# Patient Record
Sex: MALE | Race: WHITE
[De-identification: names, ages, dates, MRNs, and addresses within clinical notes are randomized per-mention and may not be internally consistent; named-entity substitution may affect disease eponyms.]

---

## 2021-07-21 ENCOUNTER — HOSPITAL ENCOUNTER (INPATIENT)
Dept: HOSPITAL 56 - MW.ED | Age: 61
LOS: 9 days | Discharge: HOME | DRG: 231 | End: 2021-07-30
Attending: SURGERY | Admitting: SURGERY
Payer: COMMERCIAL

## 2021-07-21 DIAGNOSIS — Z20.822: ICD-10-CM

## 2021-07-21 DIAGNOSIS — K37: ICD-10-CM

## 2021-07-21 DIAGNOSIS — K55.041: ICD-10-CM

## 2021-07-21 DIAGNOSIS — Z79.899: ICD-10-CM

## 2021-07-21 DIAGNOSIS — T81.49XA: ICD-10-CM

## 2021-07-21 DIAGNOSIS — Z87.891: ICD-10-CM

## 2021-07-21 DIAGNOSIS — K57.20: Primary | ICD-10-CM

## 2021-07-21 LAB
BUN SERPL-MCNC: 15 MG/DL (ref 7–18)
CHLORIDE SERPL-SCNC: 102 MMOL/L (ref 98–107)
CO2 SERPL-SCNC: 27.3 MMOL/L (ref 21–32)
GLUCOSE SERPL-MCNC: 95 MG/DL (ref 74–106)
HBA1C BLD-MCNC: 4.8 %
POTASSIUM SERPL-SCNC: 3.5 MMOL/L (ref 3.5–5.1)
SODIUM SERPL-SCNC: 141 MMOL/L (ref 136–148)

## 2021-07-21 PROCEDURE — U0002 COVID-19 LAB TEST NON-CDC: HCPCS

## 2021-07-21 PROCEDURE — C9113 INJ PANTOPRAZOLE SODIUM, VIA: HCPCS

## 2021-07-21 PROCEDURE — G0378 HOSPITAL OBSERVATION PER HR: HCPCS

## 2021-07-21 NOTE — CT
Indication:



Lower abdominal pain, patient was told he had perforation



Technique:



Volumetric multidetector CT images of the abdomen and pelvis were obtained 

after the administration of intravenous contrast.



75 cc Isovue 370 low osmolar intravenous contrast



Comparison:



None available.



Findings:



There is basilar atelectasis seen within the lung bases.



The liver is mildly prominent with minimal hepatic steatosis. There is 

subcentimeter hypodensity within the inferior right liver lobe, too small 

to characterize.



The portal vein is patent.



The gallbladder is unremarkable without evidence of radiopaque calculus.



There is no significant common biliary ductal dilatation or abrupt cut off.



The spleen is normal in enhancement and size.



The stomach and duodenum are grossly unremarkable.



The pancreas is normal in enhancement without significant atrophy.



The adrenal glands are unremarkable.



The kidneys demonstrate preserved corticomedullary differentiation without 

evidence of obstructive uropathy.



There is demonstration of marked thickening of the sigmoid colon which is 

somewhat redundant within the right lower quadrant with focal perforation 

just posterior to the terminal ileum measuring 4.0 x 3.3 centimeters. There 

is likely a secondary inflammatory changes in of the distal small bowel. 

The distal bowel is otherwise grossly within normal limits. There is mild 

fluid distention seen throughout the colon.



The appendix is not well visualized.



There is no significant mesenteric, retroperitoneal, or pelvic sidewall 

lymph nodes.



The aorta is nonaneurysmal.  There is no significant atherosclerotic 

disease appreciated.



The solid pelvic viscera are grossly unremarkable.



There is extensive free air seen throughout the entirety of the abdomen 

with minimal fluid and inflammatory changes in the right lower quadrant 

mesentery. There is no evidence of significant free fluid.



There is a small fat containing umbilical hernia.



The lumbar vertebral body heights are grossly maintained with minimal 

endplate Schmorl`s defects. There is no significant spondylolisthesis.



Impression:



Demonstration of marked thickening and pericolonic inflammation of the 

redundant sigmoid colon within the right lower quadrant with a large 

perforation along the superior border of the colon with extensive free air 

and a loculated focus of free air within the right lower quadrant 

mesentery. There is secondary inflammatory change of adjacent small bowel 

loops. There is no evidence of significant free fluid or rim enhancing 

abscess formation at this time. 



Findings were discussed with Dr. Maya at 7:20 p.m.   July 21, 2021



Please note that all CT scans at this facility use dose modulation, 

iterative reconstruction, and/or weight-based dosing when appropriate to 

reduce radiation dose to as low as reasonably achievable.



Dictated by Xiang Costello MD @ 7/21/2021 7:31:24 PM



Signed by Dr. Xiang Costello @ Jul 21 2021  7:31PM
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
## 2021-07-21 NOTE — EDM.PDOC
ED HPI GENERAL MEDICAL PROBLEM





- General


Chief Complaint: Abdominal Pain


Stated Complaint: [facility] REFERRAL, POSSIBLE PREFORATED BOWEL


Time Seen by Provider: 07/21/21 17:01


Source of Information: Reports: Patient


History Limitations: Reports: No Limitations





- History of Present Illness


INITIAL COMMENTS - FREE TEXT/NARRATIVE: 


HISTORY AND PHYSICAL:





History of present illness:


Patient is a 61-year-old male who presents to the emergency room from [facility] with complaints of abdominal pain.  He states he has had low abdominal 

pain since Sunday, has not improved.  States pain is worse with physical 

activity. He went to the Walk[facility]In clinic and had a CT of the abdomen and pelvis, 

they were concerned of a bowel perforation and sent him here to the emergency 

room.  Patient denies any fever, chills, headache, change in vision, syncope or 

near syncope. Denies any chest pain, back pain, shortness of breath or cough. 

Denies any nausea, vomiting, diarrhea, constipation or dysuria. Has not noted 

any blood in urine or stool.  Denies any testicular pain, redness or swelling.  

Patient has been eating and drinking appropriately.  Last ate yesterday at 2200,

drink a bottle of water at 1600 today.  He was instructed on NPO status.





Review of systems: 


As per history of present illness and below otherwise all systems reviewed and 

negative.





Past medical history: 


As per history of present illness and as reviewed below otherwise 

noncontributory.





Surgical history: 


As per history of present illness and as reviewed below otherwise 

noncontributory.





Social history: 


See social history for further information





Family history: 


As per history of present illness and as reviewed below otherwise 

noncontributory.





Physical exam:


General: Well developed and well nourished 61-year-old male. Alert and 

orientated x 3. Nontoxic in appearance and in no acute distress. Vital signs are

stable and have been reviewed by me. Nursing notes were reviewed. 


HEENT: Atraumatic, normocephalic, pupils equal and reactive bilaterally, 

negative for conjunctival pallor or scleral icterus, mucous membranes moist, 

trachea midline. No drooling or trismus noted. No meningeal signs. No hot potato

voice noted. 


Lungs: Clear to auscultation bilaterally. No wheezes, rales, or rhonchi. Chest 

nontender. Normal work of breathing, no accessory muscles used.


Heart: S1S2, regular rate and rhythm without overt murmur, gallops, or rubs. No 

JVD. No peripheral edema


Abdomen: Soft, nondistended, suprapubic tenderness with deep palpation.  

Normoactive bowel sounds. Negative for masses or costovertebral tenderness.


Skin: Intact, warm, dry. No lesions or rashes noted.


Hematologic: No petechiae or purpra. Mucosa appropriate color and normal nail 

bed color and refill.


Extremities: Atraumatic, moves all extremities per self without difficulty or 

deficits, negative for cords or calf pain. Neurovascular unremarkable.


Neuro: Awake, alert, oriented. Cranial nerves II through XII unremarkable. Cereb

ellum unremarkable. Motor and sensory unremarkable throughout. Exam nonfocal.


Psychiatric: Mood and affect are appropriate.  Normal thought process. Answering

questions appropriately.





Notes:


*This patient was seen and evaluated during the 2020 SARS-CoV-2 novel 

coronavirus pandemic period.  Community viral transmission is ongoing at time of

this encounter and the emergency department is operating under pandemic response

procedures.





Patient is a 61-year-old male who presents to the emergency room with complaints

of low suprapubic mid abdominal pain over the past 3 to 4 days.  He was sent to 

the emergency room from the walk-in clinic.  Apparently a CT scan was completed 

there and showed a perforation of his bowel.  Patient is well appearing and 

rates his pain at a 2/10.  I am unable to view the images from the CT scan that 

was performed, I will have to repeat this. He states it was done without 

contrast.  Lab work, blood cultures and lactate will be ordered due to stated 

complaint.





CBC, CMP, lactate and COVID are within normal limits.  CT of the abdomen and 

pelvis results are pending.





Dr Garcia has been here to evaluate patient. Patient is very nontoxic appearing 

and is not having much pain at this time. VSS.  Patient request to be discharged

to home as he "feels fine". CT shows marked thickening and pericolonic 

inflammation of the redundant sigmoid colon within the right lower quadrant with

a large perforation along the superior border of the colon with extensive free 

air and a loculated focus of free air within the right lower quadrant mesentery.

There is secondary inflammatory change of adjacent small bowel loops. There is 

no evidence of significant free fluid or rim enhancing abscess formation at this

time.  Patient is now agreeable to admission after some talking to.  Patient 

will be admitted for observation to MedSurg.





Diagnostics:


CBC, CMP, UA, CT abdomen and pelvis with contrast, lactate, blood cultures x2





Therapeutics:


IV fluid, Zosyn





Impression: 


Mid sigmoid colon perforation due to diverticulitis





Plan:


Observation admission to Med/Surg





Definitive disposition and diagnosis as appropriate pending reevaluation and 

review of above.





  ** abdomen


Pain Score (Numeric/FACES): 1





- Related Data


                                    Allergies











Allergy/AdvReac Type Severity Reaction Status Date / Time


 


No Known Allergies Allergy   Verified 07/21/21 17:11











Home Meds: 


                                    Home Meds





Ibuprofen 1 dose PO ASDIRECTED 07/21/21 [History]


Magnesium Sulfate 1 dose PO ASDIRECTED 07/21/21 [History]


Multivit-Min/FA/Lycopen/Lutein [Centrum Silver Men Tablet] 1 dose PO ASDIRECTED 

07/21/21 [History]


Potassium Gluconate [Potassium] 1 dose PO ASDIRECTED 07/21/21 [History]











ED ROS GENERAL





- Review of Systems


Review Of Systems: Comprehensive ROS is negative, except as noted in HPI.





ED EXAM, GI/ABD





- Physical Exam


Exam: See Below (See dictation)





Course





- Vital Signs


Last Recorded V/S: 


                                Last Vital Signs











Temp  98.1 F   07/21/21 17:15


 


Pulse  101 H  07/21/21 17:15


 


Resp  16   07/21/21 17:15


 


BP  121/85   07/21/21 17:15


 


Pulse Ox  95   07/21/21 17:15














- Orders/Labs/Meds


Orders: 


                               Active Orders 24 hr











 Category Date Time Status


 


 Patient Status [ADT] Routine ADT  07/21/21 19:28 Active


 


 Communication Order [RC] STAT Care  07/21/21 17:23 Active


 


 Intake and Output [RC] Q4HR Care  07/21/21 19:29 Active


 


 Oxygen Therapy [RC] PRN Care  07/21/21 19:28 Active


 


 RT Incentive Spirometry [RC] Q1HWA Care  07/21/21 19:26 Active


 


 Up ad Bobbi [RC] ASDIRECTED Care  07/21/21 19:26 Active


 


 Vital Signs [RC] PER UNIT ROUTINE Care  07/21/21 19:28 Active


 


 Nothing Per Oral Diet [DIET] Diet  07/21/21 Dinner Active


 


 CBC WITH AUTO DIFF [HEME] AM Lab  07/22/21 05:11 Ordered


 


 COMPREHENSIVE METABOLIC PN,CMP [CHEM] AM Lab  07/22/21 05:11 Ordered


 


 CULTURE BLOOD [BC] Stat Lab  07/21/21 17:20 Received


 


 CULTURE BLOOD [BC] Stat Lab  07/21/21 17:23 Received


 


 UA RFX FLASH AND CULT IF INDIC [URIN] Stat Lab  07/21/21 18:25 Ordered


 


 HYDROmorphone [Dilaudid] Med  07/21/21 19:26 Active





 0.5 mg IVPUSH Q1H PRN   


 


 Lactated Ringers [Ringers, Lactated] 1,000 ml Med  07/21/21 19:30 Active





 IV ASDIRECTED   


 


 Ondansetron [Zofran] Med  07/21/21 19:26 Active





 4 mg IVPUSH Q6H PRN   


 


 Pantoprazole [ProTONIX IV***] 40 mg Med  07/22/21 09:00 Active





 Sodium Chloride 0.9% [Normal Saline] 10 ml   





 IV DAILY   


 


 Sodium Chloride 0.9% [Normal Saline] Med  07/21/21 19:26 Active





 10 ml IV ASDIRECTED PRN   


 


 Sodium Chloride 0.9% [Normal Saline] 1,000 ml Med  07/21/21 17:06 Active





 IV STAT   


 


 Sodium Chloride 0.9% [Saline Flush] Med  07/21/21 17:05 Active





 10 ml FLUSH ASDIRECTED PRN   


 


 Sodium Chloride 0.9% [Saline Flush] Med  07/21/21 19:26 Active





 10 ml FLUSH ASDIRECTED PRN   


 


 Sodium Chloride 0.9% [Saline Flush] Med  07/21/21 17:05 Active





 2.5 ml FLUSH ASDIRECTED PRN   


 


 Sodium Chloride 0.9% [Saline Flush] Med  07/21/21 19:26 Active





 2.5 ml FLUSH ASDIRECTED PRN   


 


 Blood Culture x2 Reflex Set [OM.PC] Stat Oth  07/21/21 17:05 Ordered


 


 Peripheral IV Insertion Adult [OM.PC] Urgent Oth  07/21/21 19:26 Ordered


 


 Saline Lock Insert [OM.PC] Stat Oth  07/21/21 17:05 Ordered


 


 Resuscitation Status Routine Resus Stat  07/21/21 19:26 Ordered








                                Medication Orders





Hydromorphone HCl (Hydromorphone 2 Mg/Ml Syringe)  0.5 mg IVPUSH Q1H PRN


   PRN Reason: Pain (severe 7-10)


Sodium Chloride (Normal Saline)  1,000 mls @ 125 mls/hr IV STAT ONE


   Stop: 07/22/21 01:05


   Last Admin: 07/21/21 17:40  Dose: 125 mls/hr


   Documented by: MILY


Lactated Ringer's (Ringers, Lactated)  1,000 mls @ 125 mls/hr IV ASDIRECTED FAUSTINO


Pantoprazole Sodium 40 mg/ (Sodium Chloride)  10 mls @ 300 mls/hr IV DAILY FAUSTINO


Piperacillin Sod/Tazobactam (Sod 3.375 gm/ Sodium Chloride)  50 mls @ 100 mls/hr

 IV Q6H FAUSTINO


Ondansetron HCl (Ondansetron 4 Mg/2 Ml Sdv)  4 mg IVPUSH Q6H PRN


   PRN Reason: Nausea/Vomiting


Sodium Chloride (Sodium Chloride 0.9% 10 Ml Syringe)  10 ml FLUSH ASDIRECTED PRN


   PRN Reason: Keep Vein Open


   Last Admin: 07/21/21 17:41  Dose: 10 ml


   Documented by: MILY


Sodium Chloride (Sodium Chloride 0.9% 2.5 Ml Syringe)  2.5 ml FLUSH ASDIRECTED 

PRN


   PRN Reason: Keep Vein Open


   Last Admin: 07/21/21 17:41  Dose: 2.5 ml


   Documented by: MILY


Sodium Chloride (Sodium Chloride 0.9% 10 Ml Syringe)  10 ml FLUSH ASDIRECTED PRN


   PRN Reason: Keep Vein Open


Sodium Chloride (Sodium Chloride 0.9% 2.5 Ml Syringe)  2.5 ml FLUSH ASDIRECTED 

PRN


   PRN Reason: Keep Vein Open


Sodium Chloride (Sodium Chloride 0.9% 10 Ml Sdv)  10 ml IV ASDIRECTED PRN


   PRN Reason: IV Use








Labs: 


                                Laboratory Tests











  07/21/21 07/21/21 07/21/21 Range/Units





  17:05 17:05 17:23 


 


WBC  8.50    (4.0-11.0)  K/uL


 


RBC  4.68    (4.50-5.90)  M/uL


 


Hgb  15.2    (13.0-17.0)  g/dL


 


Hct  41.9    (38.0-50.0)  %


 


MCV  89.5    (80.0-98.0)  fL


 


MCH  32.5 H    (27.0-32.0)  pg


 


MCHC  36.3    (31.0-37.0)  g/dL


 


RDW Std Deviation  42.5    (28.0-62.0)  fl


 


RDW Coeff of Anne  13    (11.0-15.0)  %


 


Plt Count  228    (150-400)  K/uL


 


MPV  10.70    (7.40-12.00)  fL


 


Neut % (Auto)  74.6    (48.0-80.0)  %


 


Lymph % (Auto)  12.4 L    (16.0-40.0)  %


 


Mono % (Auto)  10.1    (0.0-15.0)  %


 


Eos % (Auto)  2.5    (0.0-7.0)  %


 


Baso % (Auto)  0.4    (0.0-1.5)  %


 


Neut # (Auto)  6.4 H    (1.4-5.7)  K/uL


 


Lymph # (Auto)  1.1    (0.6-2.4)  K/uL


 


Mono # (Auto)  0.9 H    (0.0-0.8)  K/uL


 


Eos # (Auto)  0.2    (0.0-0.7)  K/uL


 


Baso # (Auto)  0.0    (0.0-0.1)  K/uL


 


Nucleated RBC %  0.0    /100WBC


 


Nucleated RBCs #  0    K/uL


 


Sodium   141   (136-148)  mmol/L


 


Potassium   3.5   (3.5-5.1)  mmol/L


 


Chloride   102   ()  mmol/L


 


Carbon Dioxide   27.3   (21.0-32.0)  mmol/L


 


BUN   15   (7.0-18.0)  mg/dL


 


Creatinine   1.0   (0.8-1.3)  mg/dL


 


Est Cr Clr Drug Dosing   86.56   mL/min


 


Estimated GFR (MDRD)   > 60.0   ml/min


 


Glucose   95   ()  mg/dL


 


Lactic Acid    1.1  (0.4-2.0)  mmol/L


 


Calcium   8.8   (8.5-10.1)  mg/dL


 


Total Bilirubin   1.6 H   (0.2-1.0)  mg/dL


 


AST   22   (15-37)  IU/L


 


ALT   37   (14-63)  IU/L


 


Alkaline Phosphatase   73   ()  U/L


 


Total Protein   7.5   (6.4-8.2)  g/dL


 


Albumin   3.4   (3.4-5.0)  g/dL


 


Globulin   4.1 H   (2.6-4.0)  g/dL


 


Albumin/Globulin Ratio   0.8 L   (0.9-1.6)  


 


SARS-CoV-2 RNA (MAR)     (NEGATIVE)  














  07/21/21 Range/Units





  17:35 


 


WBC   (4.0-11.0)  K/uL


 


RBC   (4.50-5.90)  M/uL


 


Hgb   (13.0-17.0)  g/dL


 


Hct   (38.0-50.0)  %


 


MCV   (80.0-98.0)  fL


 


MCH   (27.0-32.0)  pg


 


MCHC   (31.0-37.0)  g/dL


 


RDW Std Deviation   (28.0-62.0)  fl


 


RDW Coeff of Anne   (11.0-15.0)  %


 


Plt Count   (150-400)  K/uL


 


MPV   (7.40-12.00)  fL


 


Neut % (Auto)   (48.0-80.0)  %


 


Lymph % (Auto)   (16.0-40.0)  %


 


Mono % (Auto)   (0.0-15.0)  %


 


Eos % (Auto)   (0.0-7.0)  %


 


Baso % (Auto)   (0.0-1.5)  %


 


Neut # (Auto)   (1.4-5.7)  K/uL


 


Lymph # (Auto)   (0.6-2.4)  K/uL


 


Mono # (Auto)   (0.0-0.8)  K/uL


 


Eos # (Auto)   (0.0-0.7)  K/uL


 


Baso # (Auto)   (0.0-0.1)  K/uL


 


Nucleated RBC %   /100WBC


 


Nucleated RBCs #   K/uL


 


Sodium   (136-148)  mmol/L


 


Potassium   (3.5-5.1)  mmol/L


 


Chloride   ()  mmol/L


 


Carbon Dioxide   (21.0-32.0)  mmol/L


 


BUN   (7.0-18.0)  mg/dL


 


Creatinine   (0.8-1.3)  mg/dL


 


Est Cr Clr Drug Dosing   mL/min


 


Estimated GFR (MDRD)   ml/min


 


Glucose   ()  mg/dL


 


Lactic Acid   (0.4-2.0)  mmol/L


 


Calcium   (8.5-10.1)  mg/dL


 


Total Bilirubin   (0.2-1.0)  mg/dL


 


AST   (15-37)  IU/L


 


ALT   (14-63)  IU/L


 


Alkaline Phosphatase   ()  U/L


 


Total Protein   (6.4-8.2)  g/dL


 


Albumin   (3.4-5.0)  g/dL


 


Globulin   (2.6-4.0)  g/dL


 


Albumin/Globulin Ratio   (0.9-1.6)  


 


SARS-CoV-2 RNA (MAR)  NEGATIVE  (NEGATIVE)  











Meds: 


Medications











Generic Name Dose Route Start Last Admin





  Trade Name Freq  PRN Reason Stop Dose Admin


 


Hydromorphone HCl  0.5 mg  07/21/21 19:26 





  Hydromorphone 2 Mg/Ml Syringe  IVPUSH  





  Q1H PRN  





  Pain (severe 7-10)  


 


Sodium Chloride  1,000 mls @ 125 mls/hr  07/21/21 17:06  07/21/21 17:40





  Normal Saline  IV  07/22/21 01:05  125 mls/hr





  STAT ONE   Administration


 


Lactated Ringer's  1,000 mls @ 125 mls/hr  07/21/21 19:30 





  Ringers, Lactated  IV  





  ASDIRECTED FAUSTINO  


 


Pantoprazole Sodium 40 mg/  10 mls @ 300 mls/hr  07/22/21 09:00 





  Sodium Chloride  IV  





  DAILY FAUSTINO  


 


Piperacillin Sod/Tazobactam  50 mls @ 100 mls/hr  07/22/21 01:00 





  Sod 3.375 gm/ Sodium Chloride  IV  





  Q6H FAUSTINO  


 


Ondansetron HCl  4 mg  07/21/21 19:26 





  Ondansetron 4 Mg/2 Ml Sdv  IVPUSH  





  Q6H PRN  





  Nausea/Vomiting  


 


Sodium Chloride  10 ml  07/21/21 17:05  07/21/21 17:41





  Sodium Chloride 0.9% 10 Ml Syringe  FLUSH   10 ml





  ASDIRECTED PRN   Administration





  Keep Vein Open  


 


Sodium Chloride  2.5 ml  07/21/21 17:05  07/21/21 17:41





  Sodium Chloride 0.9% 2.5 Ml Syringe  FLUSH   2.5 ml





  ASDIRECTED PRN   Administration





  Keep Vein Open  


 


Sodium Chloride  10 ml  07/21/21 19:26 





  Sodium Chloride 0.9% 10 Ml Syringe  FLUSH  





  ASDIRECTED PRN  





  Keep Vein Open  


 


Sodium Chloride  2.5 ml  07/21/21 19:26 





  Sodium Chloride 0.9% 2.5 Ml Syringe  FLUSH  





  ASDIRECTED PRN  





  Keep Vein Open  


 


Sodium Chloride  10 ml  07/21/21 19:26 





  Sodium Chloride 0.9% 10 Ml Sdv  IV  





  ASDIRECTED PRN  





  IV Use  














Discontinued Medications














Generic Name Dose Route Start Last Admin





  Trade Name Warren  PRN Reason Stop Dose Admin


 


Piperacillin Sod/Tazobactam  100 mls @ 100 mls/hr  07/21/21 19:01  07/21/21 

19:31





  Sod 4.5 gm/ Sodium Chloride  IV  07/21/21 20:00  Not Given





  ONETIME ONE  


 


Piperacillin Sod/Tazobactam  50 mls @ 100 mls/hr  07/21/21 19:02  07/21/21 19:29





  Sod 3.375 gm/ Sodium Chloride  IV  07/21/21 19:31  100 mls/hr





  ONETIME ONE   Administration


 


Iopamidol  100 ml  07/21/21 19:08 





  Iopamidol 755 Mg/Ml 100 Ml Bottle  IVPUSH  07/21/21 19:09 





  ONETIME STA  














Departure





- Departure


Time of Disposition: 19:21


Disposition: Refer to Observation


Clinical Impression: 


 Perforation of sigmoid colon due to diverticulitis








- Discharge Information





Sepsis Event Note (ED)





- Focused Exam


Vital Signs: 


                                   Vital Signs











  Temp Pulse Resp BP Pulse Ox


 


 07/21/21 17:15  98.1 F  101 H  16  121/85  95














- My Orders


Last 24 Hours: 


My Active Orders





07/21/21 17:05


Sodium Chloride 0.9% [Saline Flush]   10 ml FLUSH ASDIRECTED PRN 


Sodium Chloride 0.9% [Saline Flush]   2.5 ml FLUSH ASDIRECTED PRN 


Blood Culture x2 Reflex Set [OM.PC] Stat 


Saline Lock Insert [OM.PC] Stat 





07/21/21 17:06


Sodium Chloride 0.9% [Normal Saline] 1,000 ml IV STAT 





07/21/21 17:20


CULTURE BLOOD [BC] Stat 





07/21/21 17:23


Communication Order [RC] STAT 


CULTURE BLOOD [BC] Stat 





07/21/21 18:25


UA RFX FLASH AND CULT IF INDIC [URIN] Stat 














- Assessment/Plan


Last 24 Hours: 


My Active Orders





07/21/21 17:05


Sodium Chloride 0.9% [Saline Flush]   10 ml FLUSH ASDIRECTED PRN 


Sodium Chloride 0.9% [Saline Flush]   2.5 ml FLUSH ASDIRECTED PRN 


Blood Culture x2 Reflex Set [OM.PC] Stat 


Saline Lock Insert [OM.PC] Stat 





07/21/21 17:06


Sodium Chloride 0.9% [Normal Saline] 1,000 ml IV STAT 





07/21/21 17:20


CULTURE BLOOD [BC] Stat 





07/21/21 17:23


Communication Order [RC] STAT 


CULTURE BLOOD [BC] Stat 





07/21/21 18:25


UA RFX FLASH AND CULT IF INDIC [URIN] Stat

## 2021-07-22 LAB
BUN SERPL-MCNC: 16 MG/DL (ref 7–18)
BUN SERPL-MCNC: 17 MG/DL (ref 7–18)
CHLORIDE SERPL-SCNC: 102 MMOL/L (ref 98–107)
CHLORIDE SERPL-SCNC: 106 MMOL/L (ref 98–107)
CO2 SERPL-SCNC: 25.6 MMOL/L (ref 21–32)
CO2 SERPL-SCNC: 27.3 MMOL/L (ref 21–32)
GLUCOSE SERPL-MCNC: 102 MG/DL (ref 74–106)
GLUCOSE SERPL-MCNC: 120 MG/DL (ref 74–106)
POTASSIUM SERPL-SCNC: 3.9 MMOL/L (ref 3.5–5.1)
POTASSIUM SERPL-SCNC: 4.3 MMOL/L (ref 3.5–5.1)
SODIUM SERPL-SCNC: 138 MMOL/L (ref 136–148)
SODIUM SERPL-SCNC: 142 MMOL/L (ref 136–148)

## 2021-07-22 PROCEDURE — 0DBN0ZZ EXCISION OF SIGMOID COLON, OPEN APPROACH: ICD-10-PCS | Performed by: SURGERY

## 2021-07-22 PROCEDURE — 0DTJ0ZZ RESECTION OF APPENDIX, OPEN APPROACH: ICD-10-PCS | Performed by: SURGERY

## 2021-07-22 RX ADMIN — KETOROLAC TROMETHAMINE SCH MG: 30 INJECTION, SOLUTION INTRAMUSCULAR at 18:47

## 2021-07-22 RX ADMIN — SODIUM CHLORIDE SCH MLS/HR: 9 INJECTION, SOLUTION INTRAMUSCULAR; INTRAVENOUS; SUBCUTANEOUS at 08:29

## 2021-07-22 RX ADMIN — KETOROLAC TROMETHAMINE SCH MG: 30 INJECTION, SOLUTION INTRAMUSCULAR at 23:45

## 2021-07-22 NOTE — OR
SURGEON:

SONIA GARCIA MD

 

DATE OF PROCEDURE:  07/22/2021

 

PREOPERATIVE DIAGNOSIS:

Perforated sigmoid diverticulitis.

 

POSTOPERATIVE DIAGNOSIS:

Perforated sigmoid diverticulitis, appendicitis.

 

PROCEDURE PERFORMED:

Open sigmoidectomy, appendectomy.

 

PRIMARY SURGEON:

Sonia Garcia MD

 

SECONDARY SURGEON:

Rj Boyce MD

 

ANESTHESIA:

General endotracheal anesthesia.

 

FLUIDS:

1900 mL crystalloid.

 

ESTIMATED BLOOD LOSS:

500 mL.

 

URINE OUTPUT:

210 mL.

 

FINDINGS:

Perforated diverticulitis in the mid sigmoid colon.  Necrotic and perforated

area measured 5 x 4 cm.  This perforation was associated with a right lower

quadrant contained abscess cavity.  This caused inflammation of the appendix 
necessitating an

interval appendectomy.

 

COMPLICATIONS:

None.

 

INDICATIONS:

The patient is a 61-year-old male who presented to the emergency room with a

diagnosis of free air and perforated diverticulitis on an outside CT.  A repeat 
CT scan was performed

which showed a large perforation in the mid sigmoid colon.  The patient was

stable with normal labs, vitals and no abdominal pain. He admitted for IV fluids
and IV antibiotics.  I explained to him that

although the perforation appeared to be contained, the area will need to be 
resected.  I consented the patient for a

sigmoidectomy with possible ileostomy or possible colostomy.  I explained the

different types of procedures and their indications.  I explained the risks

including bleeding, infection, damage to surrounding structures, or wound

healing issues.  The patient verbalized understanding and wishes to proceed.

 

PROCEDURE IN DETAIL:

The patient was brought into the OR and placed on the OR table in supine

position.  A time-out was completed verifying the patient's name, age, date of

birth, allergies, and procedure to be performed.  General endotracheal

anesthesia was induced.  A Garcia catheter was placed.  The patient was placed in

stirrups.  The abdomen was then prepped and draped in usual standard fashion.  I

anesthetized the lower midline with 0.5% Marcaine plain.  A 15 blade was used to

make a lower midline incision.  Cautery was used to dissect down to the fascia.

The fascia was elevated with Kochers and incised using cautery.  I then bluntly

dissected down to the peritoneum.  The peritoneum was grasped with

hemostats and incised sharply with a Metzenbaum scissors.  I then extended my

incision both superiorly and inferiorly using electrocautery.  

An Brian wound retractor was placed.  I inserted my hand into the abdomen and

palpated along the right lower quadrant.  I could feel an area of phlegmon 
corresponding to the area of perforation.

I identified the sigmoid colon and followed this down to the right

lower quadrant.  Using finger dissection, I dissected the colon free of the

surrounding inflammatory tissue.  The sigmoid colon was redundant, was able to

be delivered outside of the wound.  He was noted to have a large necrotic

perforation of the mid sigmoid colon.  This measured 5 x 4 cm in size.  In order

to get better exposure of the abdomen, the Brian wound retractor was removed

and I extended his incision above the umbilicus.  A [place] retractor was then

put in place.  The small bowel and omentum was then packed into the upper

quadrant.  I turned my attention back to the right lower quadrant.  There was an
abscess cavity in the right

lower quadrant.  This involved the distal small bowel.  Inflammatory adhesions

were broken apart using finger dissection.  I then copiously irrigated the right

lower quadrant.  At the base of the abscess cavity, was an inflamed appearing

appendix.  Given its involvement in the inflammatory process, the decision was

made to remove the appendix.  Using a LigaSure device, I took down the 
appendiceal

mesentery from distal to proximal. Towards the base of the

appendix, a hemostat was used to create windows between the appendiceal

mesentery and the appendix itself.  The bridge of tissue in between these areas

was taken down using the LigaSure device.  At one point, a small bleeding vessel

was noted.  Medium clips were placed on this with good hemostasis.  Once the

appendix had been cleared away up to the base, I stapled and transected across

the base using a 35 mm blue load of staples with an endoscopic

stapling device.  The appendix was sent to Pathology, labeled as appendix.  The

distal small bowel did not appear severely inflamed.  I turned my attention back

to the sigmoid colon.  The patient's sigmoid colon was very redundant and the 
area of thickening and inflammation was limited to the area of the perforation. 
Given this, the decision was made to resect the area

of inflamed and perforated colon, and attempted an end-to-end anastomosis of the

healthy, noninflamed sigmoid colon.  A mesenteric window was made along the

chosen site for the proximal anastomosis.  A 55mm JUAN M stapler was used to staple
and

transect across the sigmoid colon.  Using a ligature device, we then took the

mesentery down towards the distal anastomotic site.  Towards the distal

anastomotic site, an artery was encountered.  We attempted to use the LigaSure

device on this; however, given how inflamed the tissue was, this did not control

the vessel.  There was a small amount of arterial bleeding.  The vessel was

grasped with a clamp.  It was then oversewn using 2 figure-of-eight 3-0 silk

sutures.  There was still a small amount of oozing along the cut edge, so a

running locking stitch was performed using a 3-0 silk suture.  After this,

hemostasis was achieved.  We then stapled and transected the distal end with a

55 mm JUAN M stapler.  The resected area of colon was sent to Pathology, labeled as

sigmoid colon.  We then brought the stapled end of the bowel together.  They

appeared to come together with no undue tension.  There was good bleeding along

the mesentery and at the stapled sites.  The colonic anastomotic site was

brought together with interrupted 3-0 silk Lembert sutures along the backwall of
the bowel.  Then, using an 11

blade, we excised the stapled edges of our bowel.  There was no spillage of

stool when doing this.  The back wall of the mucosal anastomosis was performed

using a running continuous 3-0 Vicryl stitch.  This was transitioned to a

[place] stitch anteriorly.  This was then buttressed anteriorly with interrupted
3-

0 silk Lembert sutures.  The mesentery defect was closed with interrupted 3-0

Vicryl sutures.  We then filled the abdomen with normal saline.  A leak test

was performed and there was no evidence of air leakage along the anastomotic 
line. At this point in time, Dr. Boyce and I changed our

gown and gloves. I irrigated the abdomen with normal saline mixed with ancef.  A
19-Faroese Lukasz drain

was placed in the right lower quadrant.  The drain was placed along the

anastomotic line and through the abscess cavity. It was secured to the skin 
using a 2-

0 silk suture.  The small bowel and omentum were put back in place and the

retractors were removed.  The fascia was then closed with a running 1-0 PDS 
suture.  We

irrigated the subcutaneous fat and the skin with a normal saline Ancef solution.

The skin was closed with courtney.  A NARESH dressing was put in place.  The

patient was extubated without difficulty.  The Garcia catheter and NG were

removed.  He was taken to the PACU in stable condition.  All counts were

complete and correct at the end of the case.

 

 

ODALIS / KASSIE

DD:  07/22/2021 17:57:09

DT:  07/22/2021 19:56:18

Job #:  [id number]

MTDD

## 2021-07-22 NOTE — [id number]
Post Anesthesia Note





- EVALUATION WITHIN 48HRS OF ANESTHETIC


Vital Signs in Normal Range: Yes


Patient Participated in Evaluation: Yes


Respiratory Function Stable: Yes


Airway Patent: Yes


Cardiovascular Function Stable: Yes


Hydration Status Stable: Yes


Pain Control Satisfactory: Yes


Nausea and Vomiting Control Satisfactory: Yes


Mental Status Recovered: Yes


Vital Signs: 


                                Last Vital Signs











Temp  36.9 C   07/22/21 17:22


 


Pulse  87   07/22/21 17:52


 


Resp  9 L  07/22/21 17:52


 


BP  108/78   07/22/21 17:52


 


Pulse Ox  95   07/22/21 17:52
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
## 2021-07-22 NOTE — PCM.POSTAN
POST ANESTHESIA ASSESSMENT





- MENTAL STATUS


Mental Status: Alert, Oriented





- VITAL SIGNS


Vital Signs: 


                                Last Vital Signs











Temp  36.9 C   07/22/21 17:22


 


Pulse  87   07/22/21 17:52


 


Resp  9 L  07/22/21 17:52


 


BP  108/78   07/22/21 17:52


 


Pulse Ox  95   07/22/21 17:52














- RESPIRATORY


Respiratory Status: Respiratory Rate WNL, Airway Patent, O2 Saturation Stable





- CARDIOVASCULAR


CV Status: Pulse Rate WNL, Blood Pressure Stable





- GASTROINTESTINAL


GI Status: No Symptoms





- POST OP HYDRATION


Hydration Status: Adequate & Stable

## 2021-07-22 NOTE — PCM.OPNOTE
- General Post-Op/Procedure Note


Date of Surgery/Procedure: 07/22/21


Operative Procedure(s): Sigmoidectomy, appendectomy


Findings: 


Mid sigmoid colon perforated and necrotic diverticuli on the anti-mesenteric 

side. Associated abscess cavity in the RLQ involving the appendix. Appendix 

thickened and inflamed so removed. 


Pre Op Diagnosis: Perforated sigmoid diverticulitis


Post-Op Diagnosis: Perforated sigmoid diverticulitis, appendicitis


Anesthesia Technique: General ET Tube


Primary Surgeon: Sonia Garcia


Secondary Surgeon: Rj Boyce


Fluid Replacement, Intraop: 1,900


Output, Urine Amount: 210


EBL in mLs: 500


Surgical Drain/Tube Type: Lukasz Drain


Condition: Stable


Free Text/Narrative:: 


                                 Intake & Output











 07/22/21 07/22/21 07/22/21





 06:59 14:59 22:59


 


Intake Total 1211  


 


Output Total 450  


 


Balance 761

## 2021-07-22 NOTE — PCM.SN.2
- Free Text/Narrative


Note: 


S/p open sigmoidectomy and appendectomy for perforated sigmoid diverticulitis 

and appendicitis





Pain: Scheduled IV Tylenol and toradol. PRN IV dilaudid. 


CV/Pulm: Vitals stable throughout the case. Encourage IS use. 


GI: NPO except ice chips tonight. If not nausea or vomiting, will advance to 

clears tomorrow. 


Renal: Pending BMP. Continue LR @125ml/hr. Will check mag and phos


ID: IV zosyn 3.375 mg q 6hr. 


Heme: Checking h/h. CBC pending. 


Px: Lovenox to start tomorrow. Pantoprazol 40mg IV daily. SCDs

## 2021-07-23 LAB
BUN SERPL-MCNC: 16 MG/DL (ref 7–18)
CHLORIDE SERPL-SCNC: 102 MMOL/L (ref 98–107)
CO2 SERPL-SCNC: 26.7 MMOL/L (ref 21–32)
GLUCOSE SERPL-MCNC: 143 MG/DL (ref 74–106)
POTASSIUM SERPL-SCNC: 4.2 MMOL/L (ref 3.5–5.1)
SODIUM SERPL-SCNC: 137 MMOL/L (ref 136–148)

## 2021-07-23 RX ADMIN — SODIUM CHLORIDE SCH MLS/HR: 9 INJECTION, SOLUTION INTRAMUSCULAR; INTRAVENOUS; SUBCUTANEOUS at 08:54

## 2021-07-23 RX ADMIN — KETOROLAC TROMETHAMINE SCH MG: 30 INJECTION, SOLUTION INTRAMUSCULAR at 05:25

## 2021-07-23 RX ADMIN — KETOROLAC TROMETHAMINE SCH MG: 30 INJECTION, SOLUTION INTRAMUSCULAR at 18:23

## 2021-07-23 RX ADMIN — KETOROLAC TROMETHAMINE SCH MG: 30 INJECTION, SOLUTION INTRAMUSCULAR at 11:24

## 2021-07-23 NOTE — PCM.SURGPN
- General Info


Date of Service: 07/23/21


Date of Surgery/Procedure: 07/22/21


POD#: 1


Functional Status: Reports: Pain Controlled, Tolerating Diet, Ambulating, 

Urinating, New Symptoms





- Review of Systems


General: Reports: No Symptoms


HEENT: Reports: No Symptoms


Pulmonary: Reports: No Symptoms


Cardiovascular: Reports: No Symptoms


Gastrointestinal: Reports: Constipation.  Denies: Abdominal Pain, Flatus, 

Nausea, Vomiting


Genitourinary: Reports: No Symptoms


Musculoskeletal: Reports: No Symptoms


Skin: Reports: No Symptoms





- Patient Data


Vitals - Most Recent: 


                                Last Vital Signs











Temp  36.8 C   07/23/21 04:00


 


Pulse  68   07/23/21 04:00


 


Resp  18   07/23/21 04:00


 


BP  132/75   07/23/21 04:00


 


Pulse Ox  96   07/23/21 04:00











Weight - Most Recent: 103.555 kg


I&O - Last 24 Hours: 


                                 Intake & Output











 07/22/21 07/23/21 07/23/21





 22:59 06:59 14:59


 


Intake Total 4600 150 


 


Output Total 530 750 


 


Balance 4070 -600 











Lab Results Last 24 Hrs: 


                         Laboratory Results - last 24 hr











  07/22/21 07/22/21 07/22/21 Range/Units





  15:35 17:45 17:45 


 


WBC   6.12   (4.0-11.0)  K/uL


 


RBC   4.55   (4.50-5.90)  M/uL


 


Hgb   14.6   (13.0-17.0)  g/dL


 


Hct   40.5   (38.0-50.0)  %


 


MCV   89.0   (80.0-98.0)  fL


 


MCH   32.1 H   (27.0-32.0)  pg


 


MCHC   36.0   (31.0-37.0)  g/dL


 


RDW Std Deviation   41.4   (28.0-62.0)  fl


 


RDW Coeff of Anne   13   (11.0-15.0)  %


 


Plt Count   226   (150-400)  K/uL


 


MPV   10.60   (7.40-12.00)  fL


 


Nucleated RBC %   0.0   /100WBC


 


Nucleated RBCs #   0   K/uL


 


Sodium    138  (136-148)  mmol/L


 


Potassium    3.9  (3.5-5.1)  mmol/L


 


Chloride    102  ()  mmol/L


 


Carbon Dioxide    25.6  (21.0-32.0)  mmol/L


 


BUN    17  (7.0-18.0)  mg/dL


 


Creatinine    0.9  (0.8-1.3)  mg/dL


 


Est Cr Clr Drug Dosing    96.18  mL/min


 


Estimated GFR (MDRD)    > 60.0  ml/min


 


Glucose    120 H  ()  mg/dL


 


Calcium    7.8 L  (8.5-10.1)  mg/dL


 


Phosphorus    4.2  (2.6-4.7)  mg/dL


 


Magnesium    2.4  (1.8-2.4)  mg/dL


 


Blood Type  B NEGATIVE    


 


Antibody Screen  NEGATIVE    














  07/23/21 07/23/21 Range/Units





  05:02 05:02 


 


WBC  9.31   (4.0-11.0)  K/uL


 


RBC  4.43 L   (4.50-5.90)  M/uL


 


Hgb  14.0   (13.0-17.0)  g/dL


 


Hct  39.5   (38.0-50.0)  %


 


MCV  89.2   (80.0-98.0)  fL


 


MCH  31.6   (27.0-32.0)  pg


 


MCHC  35.4   (31.0-37.0)  g/dL


 


RDW Std Deviation  41.1   (28.0-62.0)  fl


 


RDW Coeff of Anne  13   (11.0-15.0)  %


 


Plt Count  255   (150-400)  K/uL


 


MPV  10.80   (7.40-12.00)  fL


 


Nucleated RBC %  0.0   /100WBC


 


Nucleated RBCs #  0   K/uL


 


Sodium   137  (136-148)  mmol/L


 


Potassium   4.2  (3.5-5.1)  mmol/L


 


Chloride   102  ()  mmol/L


 


Carbon Dioxide   26.7  (21.0-32.0)  mmol/L


 


BUN   16  (7.0-18.0)  mg/dL


 


Creatinine   0.9  (0.8-1.3)  mg/dL


 


Est Cr Clr Drug Dosing   96.18  mL/min


 


Estimated GFR (MDRD)   > 60.0  ml/min


 


Glucose   143 H  ()  mg/dL


 


Calcium   8.1 L  (8.5-10.1)  mg/dL


 


Phosphorus   4.2  (2.6-4.7)  mg/dL


 


Magnesium   2.4  (1.8-2.4)  mg/dL


 


Blood Type    


 


Antibody Screen    











Alexander Results Last 24 Hrs: 


                                  Microbiology











 07/21/21 17:23 Aerobic Blood Culture - Preliminary





 Blood - Venous - Lab Draw    NO GROWTH AFTER 1 DAY





 Anaerobic Blood Culture - Preliminary





    NO GROWTH AFTER 1 DAY


 


 07/21/21 17:20 Aerobic Blood Culture - Preliminary





 Blood - Venous    NO GROWTH AFTER 1 DAY





 Anaerobic Blood Culture - Preliminary





    NO GROWTH AFTER 1 DAY











Med Orders - Current: 


                               Current Medications





Enoxaparin Sodium (Enoxaparin 40 Mg/0.4 Ml Syringe)  40 mg SUBCUT Q24H [facility]


Hydromorphone HCl (Hydromorphone 1 Mg/Ml Syringe)  0.5 mg IVPUSH Q1H PRN


   PRN Reason: Pain (severe 7-10)


Lactated Ringer's (Ringers, Lactated)  1,000 mls @ 1,000 mls/hr IV ASDIRECTED 

[facility]


   Last Admin: 07/23/21 05:25 Dose:  150 mls/hr


   Documented by: 


Pantoprazole Sodium 40 mg/ (Sodium Chloride)  10 mls @ 300 mls/hr IV DAILY [facility]


   Last Admin: 07/22/21 08:29 Dose:  300 mls/hr


   Documented by: 


Piperacillin Sod/Tazobactam (Sod 3.375 gm/ Sodium Chloride)  50 mls @ 100 mls/hr

IV Q6H [facility]


   Last Admin: 07/23/21 06:40 Dose:  100 mls/hr


   Documented by: 


Acetaminophen 1,000 mg/ Premix  100 mls @ 400 mls/hr IV Q6H PRN


   PRN Reason: Pain


Ketorolac Tromethamine (Ketorolac 30 Mg/Ml Sdv)  30 mg IVPUSH Q6H [facility]


   Stop: 07/27/21 23:59


   Last Admin: 07/23/21 05:25 Dose:  30 mg


   Documented by: 


Ondansetron HCl (Ondansetron 4 Mg/2 Ml Sdv)  4 mg IVPUSH Q6H PRN


   PRN Reason: Nausea/Vomiting


Sodium Chloride (Sodium Chloride 0.9% 10 Ml Syringe)  10 ml FLUSH ASDIRECTED PRN


   PRN Reason: Keep Vein Open


   Last Admin: 07/21/21 17:41 Dose:  10 ml


   Documented by: 


Sodium Chloride (Sodium Chloride 0.9% 2.5 Ml Syringe)  2.5 ml FLUSH ASDIRECTED 

PRN


   PRN Reason: Keep Vein Open


   Last Admin: 07/21/21 17:41 Dose:  2.5 ml


   Documented by: 


Sodium Chloride (Sodium Chloride 0.9% 10 Ml Syringe)  10 ml FLUSH ASDIRECTED PRN


   PRN Reason: Keep Vein Open


Sodium Chloride (Sodium Chloride 0.9% 2.5 Ml Syringe)  2.5 ml FLUSH ASDIRECTED 

PRN


   PRN Reason: Keep Vein Open


Sodium Chloride (Sodium Chloride 0.9% 10 Ml Sdv)  10 ml IV ASDIRECTED PRN


   PRN Reason: IV Use





Discontinued Medications





Albuterol (Albuterol 0.083% 2.5 Mg/3 Ml Neb Soln)  2.5 mg NEB ONETIME PRN


   PRN Reason: Wheezing


Bupivacaine HCl (Bupivacaine 0.5% 30 Ml Sdv) Confirm Administered Dose 60 ml 

.ROUTE .STK-MED ONE


   Stop: 07/22/21 11:29


Bupivacaine HCl (Bupivacaine 0.5% 30 Ml Sdv) Confirm Administered Dose 30 ml 

.ROUTE .STK-MED ONE


   Stop: 07/22/21 12:50


Cefazolin Sodium (Cefazolin 1 Gm Vial) Confirm Administered Dose 1 gm .ROUTE 

.STK-MED ONE


   Stop: 07/22/21 12:50


Cefazolin Sodium (Cefazolin 1 Gm Vial) Confirm Administered Dose 1 gm .ROUTE 

.STK-MED ONE


   Stop: 07/22/21 13:58


Desflurane (Desflurane 240 Ml Bottle) Confirm Administered Dose 240 ml .ROUTE 

.STK-MED ONE


   Stop: 07/22/21 16:30


Dexmedetomidine HCl (Dexmedetomidine 200 Mcg/2 Ml Sdv) Confirm Administered Dose

200 mcg .ROUTE .STK-MED ONE


   Stop: 07/22/21 12:13


Droperidol (Droperidol 5 Mg/2 Ml Sdv)  0.625 mg IVPUSH ONETIME PRN


   PRN Reason: Nausea/Vomiting


Fentanyl (Fentanyl 250 Mcg/5 Ml Sdv) Confirm Administered Dose 250 mcg .ROUTE 

.STK-MED ONE


   Stop: 07/22/21 12:11


Fentanyl (Fentanyl 100 Mcg/2 Ml Sdv) Confirm Administered Dose 100 mcg .ROUTE 

.STK-MED ONE


   Stop: 07/22/21 16:30


Fentanyl (Fentanyl 100 Mcg/2 Ml Sdv)  50 mcg IVPUSH Q5M PRN


   PRN Reason: Pain (mild 1-3)


Glycopyrrolate (Glycopyrrolate 0.2 Mg/Ml Sdv) Confirm Administered Dose 0.2 mg 

.ROUTE .STK-MED ONE


   Stop: 07/22/21 12:13


Hydromorphone HCl (Hydromorphone 2 Mg/Ml Syringe) Confirm Administered Dose 2 mg

.ROUTE .STK-MED ONE


   Stop: 07/22/21 14:13


Hydromorphone HCl (Hydromorphone 2 Mg/Ml Syringe)  1 mg IVPUSH Q10M PRN


   PRN Reason: Pain (moderate 4-6)


Sodium Chloride (Normal Saline)  1,000 mls @ 125 mls/hr IV STAT ONE


   Stop: 07/22/21 01:05


   Last Admin: 07/21/21 17:40 Dose:  125 mls/hr


   Documented by: 


Piperacillin Sod/Tazobactam (Sod 4.5 gm/ Sodium Chloride)  100 mls @ 100 mls/hr 

IV ONETIME ONE


   Stop: 07/21/21 20:00


   Last Admin: 07/21/21 19:31 Dose:  Not Given


   Documented by: 


Piperacillin Sod/Tazobactam (Sod 3.375 gm/ Sodium Chloride)  50 mls @ 100 mls/hr

IV ONETIME ONE


   Stop: 07/21/21 19:31


   Last Admin: 07/21/21 19:29 Dose:  100 mls/hr


   Documented by: 


Acetaminophen (Ofirmev 1000 Mg/100 Ml) Confirm Administered Dose 100 mls @ as 

directed .ROUTE .STK-MED ONE


   Stop: 07/22/21 12:57


Sodium Chloride (Normal Saline) Confirm Administered Dose 20 mls @ as directed 

.ROUTE .STK-MED ONE


   Stop: 07/22/21 14:20


Lactated Ringer's (Ringers, Lactated)  1,000 mls @ 1,000 mls/hr IV ASDIRECTED 

ONE


   Stop: 07/23/21 00:59


   Last Admin: 07/23/21 00:00 Dose:  1,000 mls/hr


   Documented by: 


Iopamidol (Iopamidol 755 Mg/Ml 100 Ml Bottle)  100 ml IVPUSH ONETIME STA


   Stop: 07/21/21 19:09


   Last Admin: 07/22/21 08:18 Dose:  Not Given


   Documented by: 


Ketamine HCl (Ketamine 500 Mg/10 Ml Mdv) Confirm Administered Dose 500 mg .ROUTE

.STK-MED ONE


   Stop: 07/22/21 12:14


Ketorolac Tromethamine (Ketorolac 30 Mg/Ml Sdv) Confirm Administered Dose 30 mg 

.ROUTE .[employer]-MED ONE


   Stop: 07/22/21 12:14


Lidocaine (Lidocaine 2% 5 Ml Sdv) Confirm Administered Dose 5 ml .ROUTE .[employer]-MED

ONE


   Stop: 07/22/21 12:13


Metoclopramide HCl (Metoclopramide 10 Mg/2 Ml Sdv) Confirm Administered Dose 10 

mg .ROUTE .[employer]-MED ONE


   Stop: 07/22/21 12:13


Midazolam HCl (Midazolam 1 Mg/Ml 2 Ml Sdv) Confirm Administered Dose 2 mg .ROUTE

.[employer]-MED ONE


   Stop: 07/22/21 12:24


Morphine Sulfate (Morphine 2 Mg/Ml Syringe)  2 mg IVPUSH Q10M PRN


   PRN Reason: Pain (severe 7-10)


Naloxone HCl (Naloxone 0.4 Mg/Ml Syringe)  0.1 mg IVPUSH ASDIRECTED PRN


   PRN Reason: Respiratory Depression


Ondansetron HCl (Ondansetron 4 Mg/2 Ml Sdv) Confirm Administered Dose 4 mg 

.ROUTE .[employer]-MED ONE


   Stop: 07/22/21 12:13


Ondansetron HCl (Ondansetron 4 Mg/2 Ml Sdv) Confirm Administered Dose 4 mg 

.ROUTE .[employer]-MED ONE


   Stop: 07/22/21 14:06


Ondansetron HCl (Ondansetron 4 Mg/2 Ml Sdv)  4 mg IVPUSH ONETIME PRN


   PRN Reason: Nausea/Vomiting


Propofol (Propofol 200 Mg/20 Ml Sdv) Confirm Administered Dose 400 mg .ROUTE 

.[employer]-MED ONE


   Stop: 07/22/21 12:17


Propofol (Propofol 200 Mg/20 Ml Sdv) Confirm Administered Dose 200 mg .ROUTE 

.[employer]-MED ONE


   Stop: 07/22/21 15:06


Rocuronium Bromide (Rocuronium Bromide 50 Mg/5 Ml Syringe) Confirm Administered 

Dose 50 mg .ROUTE .[employer]-MED ONE


   Stop: 07/22/21 12:13


Rocuronium Bromide (Rocuronium Bromide 50 Mg/5 Ml Syringe) Confirm Administered 

Dose 50 mg .ROUTE .[employer]-MED ONE


   Stop: 07/22/21 14:05


Sugammadex Sodium (Sugammadex Sodium 200 Mg/2 Ml Vial) Confirm Administered Dose

200 mg .ROUTE .STK-MED ONE


   Stop: 07/22/21 12:13











- Exam


Wound/Incisions: Healing Well


General: Alert, Oriented


Lungs: Normal Respiratory Effort


Cardiovascular: Regular Rate


GI/Abdominal Exam: Normal Bowel Sounds, Soft, Non-Tender, Distended


Skin: Warm, Dry, Intact


Neurological: No New Focal Deficit


Psy/Mental Status: Alert, Normal Affect, Normal Mood





Sepsis Event Note





- Evaluation


Sepsis Screening Result: No Definite Risk





- Focused Exam


Vital Signs: 


                                   Vital Signs











  Temp Pulse Resp BP Pulse Ox Pulse Ox


 


 07/23/21 04:00  36.8 C  68  18  132/75  96 


 


 07/23/21 00:00  36.7 C  70  18  137/87  96 


 


 07/22/21 23:00  36.7 C  79  18  136/88  95 


 


 07/22/21 22:00  36.7 C  74  17  126/83  94 L 


 


 07/22/21 21:00  36.5 C  77  17  116/87  94 L  95


 


 07/22/21 20:30  36.6 C  73  18  118/86  97 


 


 07/22/21 20:00  36.7 C  77  17  120/79  95 


 


 07/22/21 19:30  36.7 C  87  17  108/69  87 L 














- Problem List & Annotations


(1) Perforation of sigmoid colon due to diverticulitis


SNOMED Code(s): 6557650699380799


   Code(s): K57.20 - DVTRCLI OF LG INT W PERFORATION AND ABSCESS W/O BLEEDING   

Status: Acute   Current Visit: No   





- Problem List Review


Problem List Initiated/Reviewed/Updated: Yes





- My Orders


Last 24 Hours: 


                               Active Orders 24 hr











 Category Date Time Status


 


 Patient Status [ADT] Routine ADT  07/22/21 17:40 Active


 


 Blood Glucose Check, Bedside [RC] PRN Care  07/22/21 17:50 Active


 


 Notify Provider Vital Signs [RC] ASDIRECTED Care  07/22/21 17:50 Active


 


 Overnight Pulse Oximetry [RC] Click to Edit Care  07/22/21 17:50 Active


 


 Oxygen Therapy [RC] PRN Care  07/22/21 17:50 Active


 


 RT Aerosol Therapy [RC] ASDIRECTED Care  07/22/21 17:50 Active


 


 RT Aerosol Therapy [RC] ASDIRECTED Care  07/22/21 17:50 Active


 


 RT BiPAP/CPAP [RC] ASDIRECTED Care  07/22/21 17:50 Active


 


 Vital Signs [RC] Q5M Care  07/22/21 17:50 Active


 


 Clear Liquid Diet [DIET] Diet  07/23/21 Lunch Ordered


 


 NPO Now [Nothing per Oral Now Diet] [DIET] Diet  07/22/21 Lunch Active


 


 CBC W/O DIFF,HEMOGRAM [HEME] AM Lab  07/24/21 05:11 Ordered


 


 CBC W/O DIFF,HEMOGRAM [HEME] AM Lab  07/25/21 05:11 Ordered


 


 CBC W/O DIFF,HEMOGRAM [HEME] AM Lab  07/26/21 05:11 Ordered


 


 Acetaminophen [Ofirmev 1000 mg/100 ml] 1,000 mg Med  07/22/21 19:00 Active





 Premix Bag 1 bag   





 IV Q6H   


 


 Enoxaparin [Lovenox] Med  07/23/21 09:00 Active





 40 mg SUBCUT Q24H   


 


 HYDROmorphone [Dilaudid] Med  07/22/21 07:40 Active





 0.5 mg IVPUSH Q1H PRN   


 


 Ketorolac [Toradol] Med  07/22/21 17:45 Active





 30 mg IVPUSH Q6H   


 


 Pantoprazole [ProTONIX IV***] 40 mg Med  07/22/21 09:00 Active





 Sodium Chloride 0.9% [Normal Saline] 10 ml   





 IV DAILY   


 


 Pulse Oximetry Continuous Monitoring [OM.PC] Routine Oth  07/22/21 17:50 

Ordered








                                Medication Orders





Enoxaparin Sodium (Enoxaparin 40 Mg/0.4 Ml Syringe)  40 mg SUBCUT Q24H [facility]


Hydromorphone HCl (Hydromorphone 1 Mg/Ml Syringe)  0.5 mg IVPUSH Q1H PRN


   PRN Reason: Pain (severe 7-10)


Lactated Ringer's (Ringers, Lactated)  1,000 mls @ 1,000 mls/hr IV ASDIRECTED 

[facility]


   Last Admin: 07/23/21 05:25  Dose: 150 mls/hr


   Documented by: JEROME


   Infusion: 07/23/21 03:24  Dose: 150 mls/hr


   Documented by: JEROME


   Infusion: 07/23/21 01:20  Dose: 150 mls/hr


   Documented by: JEROME


   Admin: 07/22/21 19:48  Dose: 125 mls/hr


   Documented by: JEROME


   Infusion: 07/22/21 14:27  Dose: 125 mls/hr


   Documented by: JEROME


   Admin: 07/22/21 06:27  Dose: 125 mls/hr


   Documented by: DALLAS


   Infusion: 07/22/21 05:05  Dose: 125 mls/hr


   Documented by: DALLAS


   Admin: 07/21/21 21:05  Dose: 125 mls/hr


   Documented by: RICKY


Pantoprazole Sodium 40 mg/ (Sodium Chloride)  10 mls @ 300 mls/hr IV DAILY [facility]


   Last Admin: 07/22/21 08:29  Dose: 300 mls/hr


   Documented by: DAISY


Piperacillin Sod/Tazobactam (Sod 3.375 gm/ Sodium Chloride)  50 mls @ 100 mls/hr

 IV Q6H [facility]


   Last Admin: 07/23/21 06:40  Dose: 100 mls/hr


   Documented by: JEROME


   Infusion: 07/23/21 00:44  Dose: 100 mls/hr


   Documented by: JEROME


   Admin: 07/23/21 00:14  Dose: 100 mls/hr


   Documented by: JEROME


   Infusion: 07/22/21 19:13  Dose: 100 mls/hr


   Documented by: JEROME


   Admin: 07/22/21 18:43  Dose: 100 mls/hr


   Documented by: RONY


   Admin: 07/22/21 14:40 Dose:  Not Given


   Documented by: RONY


   Infusion: 07/22/21 06:58  Dose: 100 mls/hr


   Documented by: RONY


   Admin: 07/22/21 06:28  Dose: 100 mls/hr


   Documented by: DALLAS


   Infusion: 07/22/21 00:38  Dose: 100 mls/hr


   Documented by: DALLAS


   Admin: 07/22/21 00:08  Dose: 100 mls/hr


   Documented by: DALLAS


Acetaminophen 1,000 mg/ Premix  100 mls @ 400 mls/hr IV Q6H PRN


   PRN Reason: Pain


Ketorolac Tromethamine (Ketorolac 30 Mg/Ml Sdv)  30 mg IVPUSH Q6H [facility]


   Stop: 07/27/21 23:59


   Last Admin: 07/23/21 05:25  Dose: 30 mg


   Documented by: JEROME


   Admin: 07/22/21 23:45  Dose: 30 mg


   Documented by: JEROME


   Admin: 07/22/21 18:47  Dose: 30 mg


   Documented by: RONY


Ondansetron HCl (Ondansetron 4 Mg/2 Ml Sdv)  4 mg IVPUSH Q6H PRN


   PRN Reason: Nausea/Vomiting


Sodium Chloride (Sodium Chloride 0.9% 10 Ml Syringe)  10 ml FLUSH ASDIRECTED PRN


   PRN Reason: Keep Vein Open


   Last Admin: 07/21/21 17:41  Dose: 10 ml


   Documented by: MILY


Sodium Chloride (Sodium Chloride 0.9% 2.5 Ml Syringe)  2.5 ml FLUSH ASDIRECTED 

PRN


   PRN Reason: Keep Vein Open


   Last Admin: 07/21/21 17:41  Dose: 2.5 ml


   Documented by: MILY


Sodium Chloride (Sodium Chloride 0.9% 10 Ml Syringe)  10 ml FLUSH ASDIRECTED PRN


   PRN Reason: Keep Vein Open


Sodium Chloride (Sodium Chloride 0.9% 2.5 Ml Syringe)  2.5 ml FLUSH ASDIRECTED 

PRN


   PRN Reason: Keep Vein Open


Sodium Chloride (Sodium Chloride 0.9% 10 Ml Sdv)  10 ml IV ASDIRECTED PRN


   PRN Reason: IV Use











- Plan


Plan                        (Free Text/Narrative):: 


Pain: scheduled IV Tylenol and IV Toradol. Prn dilaudid


CV/Pulm: VSS. Encourage IS use and OOB activity. 


GI: Clear liquid diet until return of bowel function. 


Renal: UOP picked up with bolus of IVF last evening. BUN/CR WNL and stable. Mag 

Phos WNL. Continue IVF until tolerating clear liquid diet. 


Heme: Hgb stable. Plt normal. 


ID: Continue IV zosyn 3.375mg q 6hr. WBC within normal limits. Afebrile.


Px: SCDs, lovenox to start this am. GI px IV protonix 





If tolerating clear diet later today, will d/c IV and transition to oral 

medications.

## 2021-07-24 RX ADMIN — KETOROLAC TROMETHAMINE SCH MG: 30 INJECTION, SOLUTION INTRAMUSCULAR at 04:54

## 2021-07-24 RX ADMIN — KETOROLAC TROMETHAMINE SCH MG: 30 INJECTION, SOLUTION INTRAMUSCULAR at 11:57

## 2021-07-24 RX ADMIN — KETOROLAC TROMETHAMINE SCH MG: 30 INJECTION, SOLUTION INTRAMUSCULAR at 18:12

## 2021-07-24 RX ADMIN — SODIUM CHLORIDE SCH MLS/HR: 9 INJECTION, SOLUTION INTRAMUSCULAR; INTRAVENOUS; SUBCUTANEOUS at 09:42

## 2021-07-24 RX ADMIN — KETOROLAC TROMETHAMINE SCH MG: 30 INJECTION, SOLUTION INTRAMUSCULAR at 00:10

## 2021-07-24 RX ADMIN — KETOROLAC TROMETHAMINE SCH MG: 30 INJECTION, SOLUTION INTRAMUSCULAR at 23:55

## 2021-07-25 RX ADMIN — KETOROLAC TROMETHAMINE PRN MG: 30 INJECTION, SOLUTION INTRAMUSCULAR at 13:46

## 2021-07-25 RX ADMIN — SODIUM CHLORIDE SCH MLS/HR: 9 INJECTION, SOLUTION INTRAMUSCULAR; INTRAVENOUS; SUBCUTANEOUS at 08:05

## 2021-07-25 RX ADMIN — KETOROLAC TROMETHAMINE SCH MG: 30 INJECTION, SOLUTION INTRAMUSCULAR at 05:15

## 2021-07-25 RX ADMIN — KETOROLAC TROMETHAMINE PRN MG: 30 INJECTION, SOLUTION INTRAMUSCULAR at 20:51

## 2021-07-25 NOTE — PN
SUBJECTIVE:

The patient says he is feeling better today.  He has been ambulating.  He still 
has

had quite a bit of flatus and has noticed some stool smears on his sheets,

but no actual bowel movements yet.  The patient is tolerating his full-liquid

diet.  No nausea or vomiting.  He has no fevers or chills.  He denies any

shortness of breath or chest pains.  He has had an increase with his drain

output.  It has still been serous, especially after he comes back from

ambulation.

 

OBJECTIVE:

GENERAL:  Lying comfortably in his bed.  He is alert and oriented.  No acute

distress.

ABDOMEN:  Soft.  Abdomen still has moderate distention/bloating, may be mildly

better than yesterday.  Minimal pain to palpation, again more incisional and

slightly in the right lower abdomen. Draining bulb is full with serous fluid.

Drain output for yesterday was 760 mL.  Incisions still has dressing in

place.

 

LABORATORY DATA:

White cell count is 7.41, hemoglobin 13.8, platelet count is 275.

 

ASSESSMENT AND PLAN:

The patient is a pleasant 61-year-old gentleman who is postop day #3 from an

open partial sigmoid colectomy for what looked like perforated diverticulitis

and abscess cavity along with an appendectomy.  He is doing well.  He has needed

no p.r.n. medications.  He is on scheduled Flexeril and Toradol.

 

With the increase in drain output, we will leave the drain in for at least

another day.  He is to continue his antibiotics.  The patient is passing a lot

of flatus, having some stool smears and tolerating his full-liquid diet well

yesterday, so we will advance him to a regular diet today.  We will take him off

his scheduled Toradol, make it p.r.n., and get him more on his oral medications.

We will anticipate the patient's discharge in the next couple of days.

Discussed the plan with nursing staff.

 

 

NIKKI FERNANDO

DD:  07/25/2021 09:28:47

DT:  07/25/2021 10:18:43

Job #:  [id number]

ROMAIN

## 2021-07-26 LAB
BUN SERPL-MCNC: 21 MG/DL (ref 7–18)
CHLORIDE SERPL-SCNC: 102 MMOL/L (ref 98–107)
CO2 SERPL-SCNC: 28.9 MMOL/L (ref 21–32)
GLUCOSE SERPL-MCNC: 101 MG/DL (ref 74–106)
POTASSIUM SERPL-SCNC: 4 MMOL/L (ref 3.5–5.1)
SODIUM SERPL-SCNC: 137 MMOL/L (ref 136–148)

## 2021-07-26 RX ADMIN — SODIUM CHLORIDE SCH MLS/HR: 9 INJECTION, SOLUTION INTRAMUSCULAR; INTRAVENOUS; SUBCUTANEOUS at 09:25

## 2021-07-27 RX ADMIN — VANCOMYCIN SCH MLS/HR: 1.5 INJECTION, SOLUTION INTRAVENOUS at 17:51

## 2021-07-27 RX ADMIN — SODIUM CHLORIDE SCH MLS/HR: 9 INJECTION, SOLUTION INTRAMUSCULAR; INTRAVENOUS; SUBCUTANEOUS at 09:32

## 2021-07-27 NOTE — [id number].[id number]
- General Info


Date of Service: 07/26/21


Date of Surgery/Procedure: 07/22/21


POD#: 4


Functional Status: Reports: Pain Controlled, Tolerating Diet, Ambulating, 

Urinating





- Review of Systems


General: Reports: No Symptoms


HEENT: Reports: No Symptoms


Pulmonary: Reports: No Symptoms


Cardiovascular: Reports: No Symptoms


Gastrointestinal: Reports: No Symptoms


Genitourinary: Reports: No Symptoms


Musculoskeletal: Reports: No Symptoms


Skin: Reports: No Symptoms





- Patient Data


Vitals - Most Recent: 


                                Last Vital Signs











Temp  36.4 C   07/27/21 08:00


 


Pulse  89   07/27/21 08:00


 


Resp  18   07/27/21 08:00


 


BP  109/72   07/27/21 08:00


 


Pulse Ox  95   07/27/21 08:00











Weight - Most Recent: 103.555 kg


I&O - Last 24 Hours: 


                                 Intake & Output











 07/26/21 07/27/21 07/27/21





 22:59 06:59 14:59


 


Intake Total 1040 250 


 


Output Total 1830 650 


 


Balance -790 -400 











Alexander Results Last 24 Hrs: 


                                  Microbiology











 07/21/21 17:23 Aerobic Blood Culture - Final





 Blood - Venous - Lab Draw    NO GROWTH AFTER 5 DAYS





 Anaerobic Blood Culture - Final





    NO GROWTH AFTER 5 DAYS


 


 07/21/21 17:20 Aerobic Blood Culture - Final





 Blood - Venous    NO GROWTH AFTER 5 DAYS





 Anaerobic Blood Culture - Final





    NO GROWTH AFTER 5 DAYS











Med Orders - Current: 


                               Current Medications





Cyclobenzaprine HCl (Cyclobenzaprine 5 Mg Tab)  5 mg PO TID [facility]


   Last Admin: 07/27/21 06:00 Dose:  5 mg


   Documented by: 


Enoxaparin Sodium (Enoxaparin 40 Mg/0.4 Ml Syringe)  40 mg SUBCUT Q24H [facility]


   Last Admin: 07/27/21 09:32 Dose:  40 mg


   Documented by: 


Hydromorphone HCl (Hydromorphone 1 Mg/Ml Syringe)  0.5 mg IVPUSH Q1H PRN


   PRN Reason: Pain (severe 7-10)


   Last Admin: 07/23/21 16:50 Dose:  0.5 mg


   Documented by: 


Pantoprazole Sodium 40 mg/ (Sodium Chloride)  10 mls @ 300 mls/hr IV DAILY [facility]


   Last Admin: 07/27/21 09:32 Dose:  300 mls/hr


   Documented by: 


Piperacillin Sod/Tazobactam (Sod 3.375 gm/ Sodium Chloride)  100 mls @ 200 

mls/hr IV Q6H [facility]


   Last Admin: 07/27/21 10:06 Dose:  200 mls/hr


   Documented by: 


Ketorolac Tromethamine (Ketorolac 30 Mg/Ml Sdv)  30 mg IVPUSH Q6H PRN


   PRN Reason: Abdominal Pain


   Stop: 07/27/21 23:59


   Last Admin: 07/25/21 20:51 Dose:  30 mg


   Documented by: 


Multivitamins/Minerals/Vitamin C (Multivitamin Tab)  1 tab PO BEDTIME [facility]


   Last Admin: 07/26/21 21:38 Dose:  1 tab


   Documented by: 


Ondansetron HCl (Ondansetron 4 Mg/2 Ml Sdv)  4 mg IVPUSH Q6H PRN


   PRN Reason: Nausea/Vomiting


Oxycodone/Acetaminophen (Acetaminophen/Oxycodone 325-5 Mg Tab)  2 tab PO Q4H PRN


   PRN Reason: Pain (severe 7-10)


Polyethylene Glycol (Polyethylene Glycol 3350 Powder 17 Gm Packet)  17 gm PO 

DAILY [facility]


   Last Admin: 07/27/21 09:32 Dose:  17 gm


   Documented by: 


Sodium Chloride (Sodium Chloride 0.9% 10 Ml Syringe)  10 ml FLUSH ASDIRECTED PRN


   PRN Reason: Keep Vein Open


   Last Admin: 07/21/21 17:41 Dose:  10 ml


   Documented by: 


Sodium Chloride (Sodium Chloride 0.9% 2.5 Ml Syringe)  2.5 ml FLUSH ASDIRECTED 

PRN


   PRN Reason: Keep Vein Open


   Last Admin: 07/21/21 17:41 Dose:  2.5 ml


   Documented by: 


Sodium Chloride (Sodium Chloride 0.9% 10 Ml Syringe)  10 ml FLUSH ASDIRECTED PRN


   PRN Reason: Keep Vein Open


Sodium Chloride (Sodium Chloride 0.9% 2.5 Ml Syringe)  2.5 ml FLUSH ASDIRECTED 

PRN


   PRN Reason: Keep Vein Open


Sodium Chloride (Sodium Chloride 0.9% 10 Ml Sdv)  10 ml IV ASDIRECTED PRN


   PRN Reason: IV Use





Discontinued Medications





Albuterol (Albuterol 0.083% 2.5 Mg/3 Ml Neb Soln)  2.5 mg NEB ONETIME PRN


   PRN Reason: Wheezing


Bupivacaine HCl (Bupivacaine 0.5% 30 Ml Sdv) Confirm Administered Dose 60 ml 

.ROUTE .STK-MED ONE


   Stop: 07/22/21 11:29


Bupivacaine HCl (Bupivacaine 0.5% 30 Ml Sdv) Confirm Administered Dose 30 ml 

.ROUTE .STK-MED ONE


   Stop: 07/22/21 12:50


Cefazolin Sodium (Cefazolin 1 Gm Vial) Confirm Administered Dose 1 gm .ROUTE 

.STK-MED ONE


   Stop: 07/22/21 12:50


Cefazolin Sodium (Cefazolin 1 Gm Vial) Confirm Administered Dose 1 gm .ROUTE 

.STK-MED ONE


   Stop: 07/22/21 13:58


Desflurane (Desflurane 240 Ml Bottle) Confirm Administered Dose 240 ml .ROUTE 

.STK-MED ONE


   Stop: 07/22/21 16:30


Dexmedetomidine HCl (Dexmedetomidine 200 Mcg/2 Ml Sdv) Confirm Administered Dose

200 mcg .ROUTE .STK-MED ONE


   Stop: 07/22/21 12:13


Droperidol (Droperidol 5 Mg/2 Ml Sdv)  0.625 mg IVPUSH ONETIME PRN


   PRN Reason: Nausea/Vomiting


Fentanyl (Fentanyl 250 Mcg/5 Ml Sdv) Confirm Administered Dose 250 mcg .ROUTE 

.STK-MED ONE


   Stop: 07/22/21 12:11


Fentanyl (Fentanyl 100 Mcg/2 Ml Sdv) Confirm Administered Dose 100 mcg .ROUTE 

.STK-MED ONE


   Stop: 07/22/21 16:30


Fentanyl (Fentanyl 100 Mcg/2 Ml Sdv)  50 mcg IVPUSH Q5M PRN


   PRN Reason: Pain (mild 1-3)


Glycopyrrolate (Glycopyrrolate 0.2 Mg/Ml Sdv) Confirm Administered Dose 0.2 mg 

.ROUTE .STK-MED ONE


   Stop: 07/22/21 12:13


Hydromorphone HCl (Hydromorphone 2 Mg/Ml Syringe) Confirm Administered Dose 2 mg

.ROUTE .STK-MED ONE


   Stop: 07/22/21 14:13


Hydromorphone HCl (Hydromorphone 2 Mg/Ml Syringe)  1 mg IVPUSH Q10M PRN


   PRN Reason: Pain (moderate 4-6)


Sodium Chloride (Normal Saline)  1,000 mls @ 125 mls/hr IV STAT ONE


   Stop: 07/22/21 01:05


   Last Admin: 07/21/21 17:40 Dose:  125 mls/hr


   Documented by: 


Piperacillin Sod/Tazobactam (Sod 4.5 gm/ Sodium Chloride)  100 mls @ 100 mls/hr 

IV ONETIME ONE


   Stop: 07/21/21 20:00


   Last Admin: 07/21/21 19:31 Dose:  Not Given


   Documented by: 


Piperacillin Sod/Tazobactam (Sod 3.375 gm/ Sodium Chloride)  50 mls @ 100 mls/hr

IV ONETIME ONE


   Stop: 07/21/21 19:31


   Last Admin: 07/21/21 19:29 Dose:  100 mls/hr


   Documented by: 


Lactated Ringer's (Ringers, Lactated)  1,000 mls @ 1,000 mls/hr IV ASDIRECTED 

[facility]


   Last Admin: 07/23/21 05:25 Dose:  150 mls/hr


   Documented by: 


Piperacillin Sod/Tazobactam (Sod 3.375 gm/ Sodium Chloride)  50 mls @ 100 mls/hr

IV Q6H [facility]


   Last Admin: 07/25/21 13:50 Dose:  100 mls/hr


   Documented by: 


Acetaminophen (Ofirmev 1000 Mg/100 Ml) Confirm Administered Dose 100 mls @ as 

directed .ROUTE .STK-MED ONE


   Stop: 07/22/21 12:57


Sodium Chloride (Normal Saline) Confirm Administered Dose 20 mls @ as directed 

.ROUTE .STK-MED ONE


   Stop: 07/22/21 14:20


Acetaminophen 1,000 mg/ Premix  100 mls @ 400 mls/hr IV Q6H PRN


   PRN Reason: Pain


Lactated Ringer's (Ringers, Lactated)  1,000 mls @ 1,000 mls/hr IV ASDIRECTED 

ONE


   Stop: 07/23/21 00:59


   Last Admin: 07/23/21 00:00 Dose:  1,000 mls/hr


   Documented by: 


Piperacillin Sod/Tazobactam (Sod 3.375 gm/ Sodium Chloride)  50 mls @ 100 mls/hr

IV Q6H [facility]


   Last Admin: 07/26/21 14:59 Dose:  Not Given


   Documented by: 


Iopamidol (Iopamidol 755 Mg/Ml 100 Ml Bottle)  100 ml IVPUSH ONETIME STA


   Stop: 07/21/21 19:09


   Last Admin: 07/22/21 08:18 Dose:  Not Given


   Documented by: 


Ketamine HCl (Ketamine 500 Mg/10 Ml Mdv) Confirm Administered Dose 500 mg .ROUTE

.STK-MED ONE


   Stop: 07/22/21 12:14


Ketorolac Tromethamine (Ketorolac 30 Mg/Ml Sdv) Confirm Administered Dose 30 mg 

.ROUTE .STK-MED ONE


   Stop: 07/22/21 12:14


Ketorolac Tromethamine (Ketorolac 30 Mg/Ml Sdv)  30 mg IVPUSH Q6H [facility]


   Stop: 07/27/21 23:59


   Last Admin: 07/25/21 05:15 Dose:  30 mg


   Documented by: 


Lidocaine (Lidocaine 2% 5 Ml Sdv) Confirm Administered Dose 5 ml .ROUTE .STK-MED

ONE


   Stop: 07/22/21 12:13


Melatonin (Melatonin 3 Mg Tab)  3 mg PO BEDTIME ONE


   Stop: 07/25/21 20:41


   Last Admin: 07/25/21 21:55 Dose:  3 mg


   Documented by: 


Metoclopramide HCl (Metoclopramide 10 Mg/2 Ml Sdv) Confirm Administered Dose 10 

mg .ROUTE .STK-MED ONE


   Stop: 07/22/21 12:13


Midazolam HCl (Midazolam 1 Mg/Ml 2 Ml Sdv) Confirm Administered Dose 2 mg .ROUTE

.STK-MED ONE


   Stop: 07/22/21 12:24


Morphine Sulfate (Morphine 2 Mg/Ml Syringe)  2 mg IVPUSH Q10M PRN


   PRN Reason: Pain (severe 7-10)


Naloxone HCl (Naloxone 0.4 Mg/Ml Syringe)  0.1 mg IVPUSH ASDIRECTED PRN


   PRN Reason: Respiratory Depression


Ondansetron HCl (Ondansetron 4 Mg/2 Ml Sdv) Confirm Administered Dose 4 mg 

.ROUTE .STK-MED ONE


   Stop: 07/22/21 12:13


Ondansetron HCl (Ondansetron 4 Mg/2 Ml Sdv) Confirm Administered Dose 4 mg 

.ROUTE .STK-MED ONE


   Stop: 07/22/21 14:06


Ondansetron HCl (Ondansetron 4 Mg/2 Ml Sdv)  4 mg IVPUSH ONETIME PRN


   PRN Reason: Nausea/Vomiting


Propofol (Propofol 200 Mg/20 Ml Sdv) Confirm Administered Dose 400 mg .ROUTE 

.STK-MED ONE


   Stop: 07/22/21 12:17


Propofol (Propofol 200 Mg/20 Ml Sdv) Confirm Administered Dose 200 mg .ROUTE 

.STK-MED ONE


   Stop: 07/22/21 15:06


Rocuronium Bromide (Rocuronium Bromide 50 Mg/5 Ml Syringe) Confirm Administered 

Dose 50 mg .ROUTE .STK-MED ONE


   Stop: 07/22/21 12:13


Rocuronium Bromide (Rocuronium Bromide 50 Mg/5 Ml Syringe) Confirm Administered 

Dose 50 mg .ROUTE .STK-MED ONE


   Stop: 07/22/21 14:05


Sugammadex Sodium (Sugammadex Sodium 200 Mg/2 Ml Vial) Confirm Administered Dose

200 mg .ROUTE .STK-MED ONE


   Stop: 07/22/21 12:13











- Exam


Wound/Incisions: Drainage (scant drainage along the midline of the incision )


General: Alert, Oriented


Lungs: Normal Respiratory Effort


Cardiovascular: Regular Rate


GI/Abdominal Exam: Soft, Non-Tender, Distended, Other (drain with serous ouput)


Extremities: Normal Inspection


Skin: Warm, Dry, Intact


Neurological: No New Focal Deficit





Sepsis Event Note





- Evaluation


Sepsis Screening Result: No Definite Risk





- Focused Exam


Vital Signs: 


                                   Vital Signs











  Temp Pulse Resp BP Pulse Ox


 


 07/27/21 08:00  36.4 C  89  18  109/72  95


 


 07/27/21 04:00  36.6 C  89  17  119/75  95


 


 07/27/21 01:00  36.6 C  91  18  114/77  93 L














- Problem List & Annotations


(1) Perforation of sigmoid colon due to diverticulitis


SNOMED Code(s): 4560833968313475


   Code(s): K57.20 - DVTRCLI OF LG INT W PERFORATION AND ABSCESS W/O BLEEDING   

Status: Acute   Current Visit: No   





- Problem List Review


Problem List Initiated/Reviewed/Updated: Yes





- My Orders


Last 24 Hours: 


                               Active Orders 24 hr











 Category Date Time Status


 


 Multivitamins [Tab-A-Ramsey] Med  07/26/21 21:00 Active





 1 tab PO BEDTIME   


 


 Piperacillin/Tazobactam [Piperacil-Tazobact] 3.375 gm Med  07/26/21 16:00 

Active





 Sodium Chloride 0.9% [Normal Saline] 100 ml   





 IV Q6H   








                                Medication Orders





Cyclobenzaprine HCl (Cyclobenzaprine 5 Mg Tab)  5 mg PO TID [facility]


   Last Admin: 07/27/21 06:00  Dose: 5 mg


   Documented by: JEROME


   Admin: 07/26/21 21:38  Dose: 5 mg


   Documented by: JEROME


   Admin: 07/26/21 14:03  Dose: 5 mg


   Documented by: CLINT


   Admin: 07/26/21 05:35  Dose: 5 mg


   Documented by: HODA


   Admin: 07/25/21 21:00  Dose: 5 mg


   Documented by: HODA


   Admin: 07/25/21 13:52  Dose: 5 mg


   Documented by: VICKI


   Admin: 07/25/21 05:14  Dose: 5 mg


   Documented by: HODA


   Admin: 07/24/21 21:43  Dose: 5 mg


   Documented by: HODA


   Admin: 07/24/21 14:19  Dose: 5 mg


   Documented by: LILLIAN


   Admin: 07/24/21 05:19  Dose: 5 mg


   Documented by: HODA


   Admin: 07/23/21 21:31  Dose: 5 mg


   Documented by: DIONICIO


   Admin: 07/23/21 18:22  Dose: 5 mg


   Documented by: RONY


Enoxaparin Sodium (Enoxaparin 40 Mg/0.4 Ml Syringe)  40 mg SUBCUT Q24H [facility]


   Last Admin: 07/27/21 09:32  Dose: 40 mg


   Documented by: LILLIAN


   Admin: 07/26/21 09:26  Dose: 40 mg


   Documented by: CLINT


   Admin: 07/25/21 08:08  Dose: 40 mg


   Documented by: VICKI


   Admin: 07/24/21 09:47  Dose: 40 mg


   Documented by: LILLIAN


   Admin: 07/23/21 08:54  Dose: 40 mg


   Documented by: RONY


Hydromorphone HCl (Hydromorphone 1 Mg/Ml Syringe)  0.5 mg IVPUSH Q1H PRN


   PRN Reason: Pain (severe 7-10)


   Last Admin: 07/23/21 16:50  Dose: 0.5 mg


   Documented by: RONY


Pantoprazole Sodium 40 mg/ (Sodium Chloride)  10 mls @ 300 mls/hr IV DAILY [facility]


   Last Admin: 07/27/21 09:32  Dose: 300 mls/hr


   Documented by: LILLIAN


   Infusion: 07/26/21 09:27  Dose: 300 mls/hr


   Documented by: LILLIAN


   Admin: 07/26/21 09:25  Dose: 300 mls/hr


   Documented by: CLINT


   Infusion: 07/25/21 08:07  Dose: 300 mls/hr


   Documented by: CLINT


   Admin: 07/25/21 08:05  Dose: 300 mls/hr


   Documented by: VICKI


   Infusion: 07/24/21 09:44  Dose: 300 mls/hr


   Documented by: VICKI


   Admin: 07/24/21 09:42  Dose: 300 mls/hr


   Documented by: LILLIAN


   Infusion: 07/23/21 08:56  Dose: 300 mls/hr


   Documented by: LILLIAN


   Admin: 07/23/21 08:54  Dose: 300 mls/hr


   Documented by: RONY


   Infusion: 07/22/21 08:31  Dose: 300 mls/hr


   Documented by: RONY


   Admin: 07/22/21 08:29  Dose: 300 mls/hr


   Documented by: DAISY


Piperacillin Sod/Tazobactam (Sod 3.375 gm/ Sodium Chloride)  100 mls @ 200 

mls/hr IV Q6H [facility]


   Last Admin: 07/27/21 10:06  Dose: 200 mls/hr


   Documented by: LILLIAN


   Infusion: 07/27/21 04:44  Dose: 200 mls/hr


   Documented by: LILLIAN


   Admin: 07/27/21 04:14  Dose: 200 mls/hr


   Documented by: JEROME


   Infusion: 07/26/21 22:09  Dose: 200 mls/hr


   Documented by: JEROME


   Admin: 07/26/21 21:39  Dose: 200 mls/hr


   Documented by: JEROME


   Infusion: 07/26/21 16:24  Dose: 200 mls/hr


   Documented by: JEROME


   Admin: 07/26/21 15:54  Dose: 200 mls/hr


   Documented by: CLINT


Ketorolac Tromethamine (Ketorolac 30 Mg/Ml Sdv)  30 mg IVPUSH Q6H PRN


   PRN Reason: Abdominal Pain


   Stop: 07/27/21 23:59


   Last Admin: 07/25/21 20:51  Dose: 30 mg


   Documented by: HODA


   Admin: 07/25/21 13:46  Dose: 30 mg


   Documented by: VICKI


Multivitamins/Minerals/Vitamin C (Multivitamin Tab)  1 tab PO BEDTIME [facility]


   Last Admin: 07/26/21 21:38  Dose: 1 tab


   Documented by: JEROME


Ondansetron HCl (Ondansetron 4 Mg/2 Ml Sdv)  4 mg IVPUSH Q6H PRN


   PRN Reason: Nausea/Vomiting


Oxycodone/Acetaminophen (Acetaminophen/Oxycodone 325-5 Mg Tab)  2 tab PO Q4H PRN


   PRN Reason: Pain (severe 7-10)


Polyethylene Glycol (Polyethylene Glycol 3350 Powder 17 Gm Packet)  17 gm PO [facility]


   Last Admin: 07/27/21 09:32  Dose: 17 gm


   Documented by: LILLIAN


   Admin: 07/26/21 10:07  Dose: 17 gm


   Documented by: CLINT


Sodium Chloride (Sodium Chloride 0.9% 10 Ml Syringe)  10 ml FLUSH ASDIRECTED PRN


   PRN Reason: Keep Vein Open


   Last Admin: 07/21/21 17:41  Dose: 10 ml


   Documented by: MILY


Sodium Chloride (Sodium Chloride 0.9% 2.5 Ml Syringe)  2.5 ml FLUSH ASDIRECTED 

PRN


   PRN Reason: Keep Vein Open


   Last Admin: 07/21/21 17:41  Dose: 2.5 ml


   Documented by: MILY


Sodium Chloride (Sodium Chloride 0.9% 10 Ml Syringe)  10 ml FLUSH ASDIRECTED PRN


   PRN Reason: Keep Vein Open


Sodium Chloride (Sodium Chloride 0.9% 2.5 Ml Syringe)  2.5 ml FLUSH ASDIRECTED 

PRN


   PRN Reason: Keep Vein Open


Sodium Chloride (Sodium Chloride 0.9% 10 Ml Sdv)  10 ml IV ASDIRECTED PRN


   PRN Reason: IV Use











- Plan


Plan                        (Free Text/Narrative):: 





Patient is doing well. Distended but soft and nontender. Drain with serous 

output. Will add miralax to regiment. MTV daily. Continues to have flatus. Await

a BM before discharge.

## 2021-07-27 NOTE — [id number].[id number]
- General Info


Date of Service: 07/27/21


Date of Surgery/Procedure: 07/22/21


POD#: 5


Functional Status: Reports: Pain Controlled, Tolerating Diet, Ambulating, 

Urinating.  Denies: New Symptoms





- Review of Systems


General: Reports: No Symptoms


HEENT: Reports: No Symptoms


Pulmonary: Reports: No Symptoms


Cardiovascular: Reports: No Symptoms


Gastrointestinal: Reports: No Symptoms


Genitourinary: Reports: No Symptoms


Musculoskeletal: Reports: No Symptoms





- Patient Data


Vitals - Most Recent: 


                                Last Vital Signs











Temp  36.4 C   07/27/21 08:00


 


Pulse  89   07/27/21 08:00


 


Resp  18   07/27/21 08:00


 


BP  109/72   07/27/21 08:00


 


Pulse Ox  95   07/27/21 08:00











Weight - Most Recent: 103.555 kg


I&O - Last 24 Hours: 


                                 Intake & Output











 07/26/21 07/27/21 07/27/21





 22:59 06:59 14:59


 


Intake Total 1040 250 


 


Output Total 1830 650 


 


Balance -790 -400 











Alexander Results Last 24 Hrs: 


                                  Microbiology











 07/21/21 17:23 Aerobic Blood Culture - Final





 Blood - Venous - Lab Draw    NO GROWTH AFTER 5 DAYS





 Anaerobic Blood Culture - Final





    NO GROWTH AFTER 5 DAYS


 


 07/21/21 17:20 Aerobic Blood Culture - Final





 Blood - Venous    NO GROWTH AFTER 5 DAYS





 Anaerobic Blood Culture - Final





    NO GROWTH AFTER 5 DAYS











Med Orders - Current: 


                               Current Medications





Cyclobenzaprine HCl (Cyclobenzaprine 5 Mg Tab)  5 mg PO TID [facility]


   Last Admin: 07/27/21 06:00 Dose:  5 mg


   Documented by: 


Enoxaparin Sodium (Enoxaparin 40 Mg/0.4 Ml Syringe)  40 mg SUBCUT Q24H [facility]


   Last Admin: 07/27/21 09:32 Dose:  40 mg


   Documented by: 


Hydromorphone HCl (Hydromorphone 1 Mg/Ml Syringe)  0.5 mg IVPUSH Q1H PRN


   PRN Reason: Pain (severe 7-10)


   Last Admin: 07/23/21 16:50 Dose:  0.5 mg


   Documented by: 


Pantoprazole Sodium 40 mg/ (Sodium Chloride)  10 mls @ 300 mls/hr IV DAILY [facility]


   Last Admin: 07/27/21 09:32 Dose:  300 mls/hr


   Documented by: 


Piperacillin Sod/Tazobactam (Sod 3.375 gm/ Sodium Chloride)  100 mls @ 200 

mls/hr IV Q6H [facility]


   Last Admin: 07/27/21 10:06 Dose:  200 mls/hr


   Documented by: 


Ketorolac Tromethamine (Ketorolac 30 Mg/Ml Sdv)  30 mg IVPUSH Q6H PRN


   PRN Reason: Abdominal Pain


   Stop: 07/27/21 23:59


   Last Admin: 07/25/21 20:51 Dose:  30 mg


   Documented by: 


Multivitamins/Minerals/Vitamin C (Multivitamin Tab)  1 tab PO BEDTIME [facility]


   Last Admin: 07/26/21 21:38 Dose:  1 tab


   Documented by: 


Ondansetron HCl (Ondansetron 4 Mg/2 Ml Sdv)  4 mg IVPUSH Q6H PRN


   PRN Reason: Nausea/Vomiting


Oxycodone/Acetaminophen (Acetaminophen/Oxycodone 325-5 Mg Tab)  2 tab PO Q4H PRN


   PRN Reason: Pain (severe 7-10)


Polyethylene Glycol (Polyethylene Glycol 3350 Powder 17 Gm Packet)  17 gm PO 

DAILY [facility]


   Last Admin: 07/27/21 09:32 Dose:  17 gm


   Documented by: 


Sodium Chloride (Sodium Chloride 0.9% 10 Ml Syringe)  10 ml FLUSH ASDIRECTED PRN


   PRN Reason: Keep Vein Open


   Last Admin: 07/21/21 17:41 Dose:  10 ml


   Documented by: 


Sodium Chloride (Sodium Chloride 0.9% 2.5 Ml Syringe)  2.5 ml FLUSH ASDIRECTED 

PRN


   PRN Reason: Keep Vein Open


   Last Admin: 07/21/21 17:41 Dose:  2.5 ml


   Documented by: 


Sodium Chloride (Sodium Chloride 0.9% 10 Ml Syringe)  10 ml FLUSH ASDIRECTED PRN


   PRN Reason: Keep Vein Open


Sodium Chloride (Sodium Chloride 0.9% 2.5 Ml Syringe)  2.5 ml FLUSH ASDIRECTED 

PRN


   PRN Reason: Keep Vein Open


Sodium Chloride (Sodium Chloride 0.9% 10 Ml Sdv)  10 ml IV ASDIRECTED PRN


   PRN Reason: IV Use





Discontinued Medications





Albuterol (Albuterol 0.083% 2.5 Mg/3 Ml Neb Soln)  2.5 mg NEB ONETIME PRN


   PRN Reason: Wheezing


Bupivacaine HCl (Bupivacaine 0.5% 30 Ml Sdv) Confirm Administered Dose 60 ml 

.ROUTE .STK-MED ONE


   Stop: 07/22/21 11:29


Bupivacaine HCl (Bupivacaine 0.5% 30 Ml Sdv) Confirm Administered Dose 30 ml 

.ROUTE .STK-MED ONE


   Stop: 07/22/21 12:50


Cefazolin Sodium (Cefazolin 1 Gm Vial) Confirm Administered Dose 1 gm .ROUTE 

.STK-MED ONE


   Stop: 07/22/21 12:50


Cefazolin Sodium (Cefazolin 1 Gm Vial) Confirm Administered Dose 1 gm .ROUTE 

.STK-MED ONE


   Stop: 07/22/21 13:58


Desflurane (Desflurane 240 Ml Bottle) Confirm Administered Dose 240 ml .ROUTE 

.STK-MED ONE


   Stop: 07/22/21 16:30


Dexmedetomidine HCl (Dexmedetomidine 200 Mcg/2 Ml Sdv) Confirm Administered Dose

200 mcg .ROUTE .STK-MED ONE


   Stop: 07/22/21 12:13


Droperidol (Droperidol 5 Mg/2 Ml Sdv)  0.625 mg IVPUSH ONETIME PRN


   PRN Reason: Nausea/Vomiting


Fentanyl (Fentanyl 250 Mcg/5 Ml Sdv) Confirm Administered Dose 250 mcg .ROUTE 

.STK-MED ONE


   Stop: 07/22/21 12:11


Fentanyl (Fentanyl 100 Mcg/2 Ml Sdv) Confirm Administered Dose 100 mcg .ROUTE 

.STK-MED ONE


   Stop: 07/22/21 16:30


Fentanyl (Fentanyl 100 Mcg/2 Ml Sdv)  50 mcg IVPUSH Q5M PRN


   PRN Reason: Pain (mild 1-3)


Glycopyrrolate (Glycopyrrolate 0.2 Mg/Ml Sdv) Confirm Administered Dose 0.2 mg 

.ROUTE .STK-MED ONE


   Stop: 07/22/21 12:13


Hydromorphone HCl (Hydromorphone 2 Mg/Ml Syringe) Confirm Administered Dose 2 mg

.ROUTE .STK-MED ONE


   Stop: 07/22/21 14:13


Hydromorphone HCl (Hydromorphone 2 Mg/Ml Syringe)  1 mg IVPUSH Q10M PRN


   PRN Reason: Pain (moderate 4-6)


Sodium Chloride (Normal Saline)  1,000 mls @ 125 mls/hr IV STAT ONE


   Stop: 07/22/21 01:05


   Last Admin: 07/21/21 17:40 Dose:  125 mls/hr


   Documented by: 


Piperacillin Sod/Tazobactam (Sod 4.5 gm/ Sodium Chloride)  100 mls @ 100 mls/hr 

IV ONETIME ONE


   Stop: 07/21/21 20:00


   Last Admin: 07/21/21 19:31 Dose:  Not Given


   Documented by: 


Piperacillin Sod/Tazobactam (Sod 3.375 gm/ Sodium Chloride)  50 mls @ 100 mls/hr

IV ONETIME ONE


   Stop: 07/21/21 19:31


   Last Admin: 07/21/21 19:29 Dose:  100 mls/hr


   Documented by: 


Lactated Ringer's (Ringers, Lactated)  1,000 mls @ 1,000 mls/hr IV ASDIRECTED 

[facility]


   Last Admin: 07/23/21 05:25 Dose:  150 mls/hr


   Documented by: 


Piperacillin Sod/Tazobactam (Sod 3.375 gm/ Sodium Chloride)  50 mls @ 100 mls/hr

IV Q6H [facility]


   Last Admin: 07/25/21 13:50 Dose:  100 mls/hr


   Documented by: 


Acetaminophen (Ofirmev 1000 Mg/100 Ml) Confirm Administered Dose 100 mls @ as 

directed .ROUTE .STK-MED ONE


   Stop: 07/22/21 12:57


Sodium Chloride (Normal Saline) Confirm Administered Dose 20 mls @ as directed 

.ROUTE .STK-MED ONE


   Stop: 07/22/21 14:20


Acetaminophen 1,000 mg/ Premix  100 mls @ 400 mls/hr IV Q6H PRN


   PRN Reason: Pain


Lactated Ringer's (Ringers, Lactated)  1,000 mls @ 1,000 mls/hr IV ASDIRECTED 

ONE


   Stop: 07/23/21 00:59


   Last Admin: 07/23/21 00:00 Dose:  1,000 mls/hr


   Documented by: 


Piperacillin Sod/Tazobactam (Sod 3.375 gm/ Sodium Chloride)  50 mls @ 100 mls/hr

IV Q6H [facility]


   Last Admin: 07/26/21 14:59 Dose:  Not Given


   Documented by: 


Iopamidol (Iopamidol 755 Mg/Ml 100 Ml Bottle)  100 ml IVPUSH ONETIME STA


   Stop: 07/21/21 19:09


   Last Admin: 07/22/21 08:18 Dose:  Not Given


   Documented by: 


Ketamine HCl (Ketamine 500 Mg/10 Ml Mdv) Confirm Administered Dose 500 mg .ROUTE

.STK-MED ONE


   Stop: 07/22/21 12:14


Ketorolac Tromethamine (Ketorolac 30 Mg/Ml Sdv) Confirm Administered Dose 30 mg 

.ROUTE .STK-MED ONE


   Stop: 07/22/21 12:14


Ketorolac Tromethamine (Ketorolac 30 Mg/Ml Sdv)  30 mg IVPUSH Q6H [facility]


   Stop: 07/27/21 23:59


   Last Admin: 07/25/21 05:15 Dose:  30 mg


   Documented by: 


Lidocaine (Lidocaine 2% 5 Ml Sdv) Confirm Administered Dose 5 ml .ROUTE .STK-MED

ONE


   Stop: 07/22/21 12:13


Melatonin (Melatonin 3 Mg Tab)  3 mg PO BEDTIME ONE


   Stop: 07/25/21 20:41


   Last Admin: 07/25/21 21:55 Dose:  3 mg


   Documented by: 


Metoclopramide HCl (Metoclopramide 10 Mg/2 Ml Sdv) Confirm Administered Dose 10 

mg .ROUTE .STK-MED ONE


   Stop: 07/22/21 12:13


Midazolam HCl (Midazolam 1 Mg/Ml 2 Ml Sdv) Confirm Administered Dose 2 mg .ROUTE

.STK-MED ONE


   Stop: 07/22/21 12:24


Morphine Sulfate (Morphine 2 Mg/Ml Syringe)  2 mg IVPUSH Q10M PRN


   PRN Reason: Pain (severe 7-10)


Naloxone HCl (Naloxone 0.4 Mg/Ml Syringe)  0.1 mg IVPUSH ASDIRECTED PRN


   PRN Reason: Respiratory Depression


Ondansetron HCl (Ondansetron 4 Mg/2 Ml Sdv) Confirm Administered Dose 4 mg 

.ROUTE .STK-MED ONE


   Stop: 07/22/21 12:13


Ondansetron HCl (Ondansetron 4 Mg/2 Ml Sdv) Confirm Administered Dose 4 mg 

.ROUTE .STK-MED ONE


   Stop: 07/22/21 14:06


Ondansetron HCl (Ondansetron 4 Mg/2 Ml Sdv)  4 mg IVPUSH ONETIME PRN


   PRN Reason: Nausea/Vomiting


Propofol (Propofol 200 Mg/20 Ml Sdv) Confirm Administered Dose 400 mg .ROUTE 

.STK-MED ONE


   Stop: 07/22/21 12:17


Propofol (Propofol 200 Mg/20 Ml Sdv) Confirm Administered Dose 200 mg .ROUTE 

.STK-MED ONE


   Stop: 07/22/21 15:06


Rocuronium Bromide (Rocuronium Bromide 50 Mg/5 Ml Syringe) Confirm Administered 

Dose 50 mg .ROUTE .STK-MED ONE


   Stop: 07/22/21 12:13


Rocuronium Bromide (Rocuronium Bromide 50 Mg/5 Ml Syringe) Confirm Administered 

Dose 50 mg .ROUTE .STK-MED ONE


   Stop: 07/22/21 14:05


Sugammadex Sodium (Sugammadex Sodium 200 Mg/2 Ml Vial) Confirm Administered Dose

200 mg .ROUTE .STK-MED ONE


   Stop: 07/22/21 12:13











- Exam


Wound/Incisions: Other (Dressigns removed today. Blisters along umbilicus that 

contain blood. There is some redness along the lower midline. No warmth. No 

drainage. )


General: Alert, Oriented


HEENT: Pupils Equal


Lungs: Clear to Auscultation, Normal Respiratory Effort


Cardiovascular: Regular Rate, Regular Rhythm


GI/Abdominal Exam: Normal Bowel Sounds, Soft, Non-Tender, Distended (improving. 

Softer today than yesterday )


Skin: Warm, Dry, Intact


Neurological: No New Focal Deficit


Psy/Mental Status: Alert, Normal Affect, Normal Mood





Sepsis Event Note





- Evaluation


Sepsis Screening Result: No Definite Risk





- Focused Exam


Vital Signs: 


                                   Vital Signs











  Temp Pulse Resp BP Pulse Ox


 


 07/27/21 08:00  36.4 C  89  18  109/72  95


 


 07/27/21 04:00  36.6 C  89  17  119/75  95


 


 07/27/21 01:00  36.6 C  91  18  114/77  93 L














- Problem List & Annotations


(1) Perforation of sigmoid colon due to diverticulitis


SNOMED Code(s): 4368985901579848


   Code(s): K57.20 - DVTRCLI OF LG INT W PERFORATION AND ABSCESS W/O BLEEDING   

Status: Acute   Current Visit: No   





- Problem List Review


Problem List Initiated/Reviewed/Updated: Yes





- My Orders


Last 24 Hours: 


                               Active Orders 24 hr











 Category Date Time Status


 


 Multivitamins [Tab-A-Ramsey] Med  07/26/21 21:00 Active





 1 tab PO BEDTIME   


 


 Piperacillin/Tazobactam [Piperacil-Tazobact] 3.375 gm Med  07/26/21 16:00 

Active





 Sodium Chloride 0.9% [Normal Saline] 100 ml   





 IV Q6H   








                                Medication Orders





Cyclobenzaprine HCl (Cyclobenzaprine 5 Mg Tab)  5 mg PO TID [facility]


   Last Admin: 07/27/21 06:00  Dose: 5 mg


   Documented by: JEROME


   Admin: 07/26/21 21:38  Dose: 5 mg


   Documented by: JEROME


   Admin: 07/26/21 14:03  Dose: 5 mg


   Documented by: CLINT


   Admin: 07/26/21 05:35  Dose: 5 mg


   Documented by: HODA


   Admin: 07/25/21 21:00  Dose: 5 mg


   Documented by: HODA


   Admin: 07/25/21 13:52  Dose: 5 mg


   Documented by: VICKI


   Admin: 07/25/21 05:14  Dose: 5 mg


   Documented by: HODA


   Admin: 07/24/21 21:43  Dose: 5 mg


   Documented by: HODA


   Admin: 07/24/21 14:19  Dose: 5 mg


   Documented by: LILLIAN


   Admin: 07/24/21 05:19  Dose: 5 mg


   Documented by: HODA


   Admin: 07/23/21 21:31  Dose: 5 mg


   Documented by: DIONICIO


   Admin: 07/23/21 18:22  Dose: 5 mg


   Documented by: RONY


Enoxaparin Sodium (Enoxaparin 40 Mg/0.4 Ml Syringe)  40 mg SUBCUT Q24H [facility]


   Last Admin: 07/27/21 09:32  Dose: 40 mg


   Documented by: LILLIAN


   Admin: 07/26/21 09:26  Dose: 40 mg


   Documented by: CLINT


   Admin: 07/25/21 08:08  Dose: 40 mg


   Documented by: VICKI


   Admin: 07/24/21 09:47  Dose: 40 mg


   Documented by: LILLIAN


   Admin: 07/23/21 08:54  Dose: 40 mg


   Documented by: RONY


Hydromorphone HCl (Hydromorphone 1 Mg/Ml Syringe)  0.5 mg IVPUSH Q1H PRN


   PRN Reason: Pain (severe 7-10)


   Last Admin: 07/23/21 16:50  Dose: 0.5 mg


   Documented by: RONY


Pantoprazole Sodium 40 mg/ (Sodium Chloride)  10 mls @ 300 mls/hr IV DAILY [facility]


   Last Admin: 07/27/21 09:32  Dose: 300 mls/hr


   Documented by: LILLIAN


   Infusion: 07/26/21 09:27  Dose: 300 mls/hr


   Documented by: LILLIAN


   Admin: 07/26/21 09:25  Dose: 300 mls/hr


   Documented by: CLINT


   Infusion: 07/25/21 08:07  Dose: 300 mls/hr


   Documented by: CLINT


   Admin: 07/25/21 08:05  Dose: 300 mls/hr


   Documented by: VICKI


   Infusion: 07/24/21 09:44  Dose: 300 mls/hr


   Documented by: VICKI


   Admin: 07/24/21 09:42  Dose: 300 mls/hr


   Documented by: LILLIAN


   Infusion: 07/23/21 08:56  Dose: 300 mls/hr


   Documented by: LILLIAN


   Admin: 07/23/21 08:54  Dose: 300 mls/hr


   Documented by: RONY


   Infusion: 07/22/21 08:31  Dose: 300 mls/hr


   Documented by: RONY


   Admin: 07/22/21 08:29  Dose: 300 mls/hr


   Documented by: DAISY


Piperacillin Sod/Tazobactam (Sod 3.375 gm/ Sodium Chloride)  100 mls @ 200 

mls/hr IV Q6H [facility]


   Last Admin: 07/27/21 10:06  Dose: 200 mls/hr


   Documented by: LILLIAN


   Infusion: 07/27/21 04:44  Dose: 200 mls/hr


   Documented by: LILLIAN


   Admin: 07/27/21 04:14  Dose: 200 mls/hr


   Documented by: JEROME


   Infusion: 07/26/21 22:09  Dose: 200 mls/hr


   Documented by: JEROME


   Admin: 07/26/21 21:39  Dose: 200 mls/hr


   Documented by: JEROME


   Infusion: 07/26/21 16:24  Dose: 200 mls/hr


   Documented by: JEROME


   Admin: 07/26/21 15:54  Dose: 200 mls/hr


   Documented by: CLINT


Ketorolac Tromethamine (Ketorolac 30 Mg/Ml Sdv)  30 mg IVPUSH Q6H PRN


   PRN Reason: Abdominal Pain


   Stop: 07/27/21 23:59


   Last Admin: 07/25/21 20:51  Dose: 30 mg


   Documented by: HODA


   Admin: 07/25/21 13:46  Dose: 30 mg


   Documented by: [id number]


Multivitamins/Minerals/Vitamin C (Multivitamin Tab)  1 tab PO BEDTIME [facility]


   Last Admin: 07/26/21 21:38  Dose: 1 tab


   Documented by: JEROME


Ondansetron HCl (Ondansetron 4 Mg/2 Ml Sdv)  4 mg IVPUSH Q6H PRN


   PRN Reason: Nausea/Vomiting


Oxycodone/Acetaminophen (Acetaminophen/Oxycodone 325-5 Mg Tab)  2 tab PO Q4H PRN


   PRN Reason: Pain (severe 7-10)


Polyethylene Glycol (Polyethylene Glycol 3350 Powder 17 Gm Packet)  17 gm PO 

DAILY [facility]


   Last Admin: 07/27/21 09:32  Dose: 17 gm


   Documented by: LILLIAN


   Admin: 07/26/21 10:07  Dose: 17 gm


   Documented by: CLINT


Sodium Chloride (Sodium Chloride 0.9% 10 Ml Syringe)  10 ml FLUSH ASDIRECTED PRN


   PRN Reason: Keep Vein Open


   Last Admin: 07/21/21 17:41  Dose: 10 ml


   Documented by: MILY


Sodium Chloride (Sodium Chloride 0.9% 2.5 Ml Syringe)  2.5 ml FLUSH ASDIRECTED 

PRN


   PRN Reason: Keep Vein Open


   Last Admin: 07/21/21 17:41  Dose: 2.5 ml


   Documented by: FEDLKER


Sodium Chloride (Sodium Chloride 0.9% 10 Ml Syringe)  10 ml FLUSH ASDIRECTED PRN


   PRN Reason: Keep Vein Open


Sodium Chloride (Sodium Chloride 0.9% 2.5 Ml Syringe)  2.5 ml FLUSH ASDIRECTED 

PRN


   PRN Reason: Keep Vein Open


Sodium Chloride (Sodium Chloride 0.9% 10 Ml Sdv)  10 ml IV ASDIRECTED PRN


   PRN Reason: IV Use











- Plan


Plan                        (Free Text/Narrative):: 





Patient feels slightly less distended today. Tolerating a regular diet. States 

that his appetite is improving. He is still passing flatus. No BM yet. Vitals 

stable. Drain output less today than yesterday. Drain removed without 

difficulty. Marked area of redness around incision. Likely due to dressing and 

some bruising. Ok to shower. Change drain site dressing as often as needed to 

keep clean and dry. awaiting bowel movement.

## 2021-07-28 LAB
BUN SERPL-MCNC: 15 MG/DL (ref 7–18)
CHLORIDE SERPL-SCNC: 103 MMOL/L (ref 98–107)
CO2 SERPL-SCNC: 26.3 MMOL/L (ref 21–32)
GLUCOSE SERPL-MCNC: 99 MG/DL (ref 74–106)
POTASSIUM SERPL-SCNC: 4.4 MMOL/L (ref 3.5–5.1)
SODIUM SERPL-SCNC: 136 MMOL/L (ref 136–148)

## 2021-07-28 RX ADMIN — VANCOMYCIN SCH MLS/HR: 1.5 INJECTION, SOLUTION INTRAVENOUS at 16:57

## 2021-07-28 RX ADMIN — SODIUM CHLORIDE SCH MLS/HR: 9 INJECTION, SOLUTION INTRAMUSCULAR; INTRAVENOUS; SUBCUTANEOUS at 09:03

## 2021-07-28 RX ADMIN — VANCOMYCIN SCH MLS/HR: 1.5 INJECTION, SOLUTION INTRAVENOUS at 04:00

## 2021-07-28 NOTE — PCM.SURGPN
- General Info


Date of Service: 07/28/21


Date of Surgery/Procedure: 07/22/21


POD#: 6





- Review of Systems


General: Reports: No Symptoms


HEENT: Reports: No Symptoms


Pulmonary: Reports: No Symptoms


Cardiovascular: Reports: No Symptoms


Gastrointestinal: Reports: Abdominal Pain (discomfort around incision), 

Constipation, Flatus, Other (blisters along incision, incisional erythema and 

warmth).  Denies: Decreased Appetite, Diarrhea, Difficulty Swallowing


Genitourinary: Reports: No Symptoms


Musculoskeletal: Reports: No Symptoms





- Patient Data


Vitals - Most Recent: 


                                Last Vital Signs











Temp  36.5 C   07/28/21 08:00


 


Pulse  84   07/28/21 08:00


 


Resp  16   07/28/21 08:00


 


BP  112/71   07/28/21 08:00


 


Pulse Ox  95   07/28/21 08:00











Weight - Most Recent: 103.555 kg


I&O - Last 24 Hours: 


                                 Intake & Output











 07/27/21 07/28/21 07/28/21





 22:59 06:59 14:59


 


Intake Total 1620 600 


 


Output Total  650 


 


Balance 1620 -50 











Lab Results Last 24 Hrs: 


                         Laboratory Results - last 24 hr











  07/28/21 07/28/21 Range/Units





  07:53 07:53 


 


WBC  11.13 H   (4.0-11.0)  K/uL


 


RBC  4.42 L   (4.50-5.90)  M/uL


 


Hgb  14.0   (13.0-17.0)  g/dL


 


Hct  39.7   (38.0-50.0)  %


 


MCV  89.8   (80.0-98.0)  fL


 


MCH  31.7   (27.0-32.0)  pg


 


MCHC  35.3   (31.0-37.0)  g/dL


 


RDW Std Deviation  41.6   (28.0-62.0)  fl


 


RDW Coeff of Anne  13   (11.0-15.0)  %


 


Plt Count  378   (150-400)  K/uL


 


MPV  10.00   (7.40-12.00)  fL


 


Nucleated RBC %  0.0   /100WBC


 


Nucleated RBCs #  0   K/uL


 


Sodium   136  (136-148)  mmol/L


 


Potassium   4.4  (3.5-5.1)  mmol/L


 


Chloride   103  ()  mmol/L


 


Carbon Dioxide   26.3  (21.0-32.0)  mmol/L


 


BUN   15  (7.0-18.0)  mg/dL


 


Creatinine   1.0  (0.8-1.3)  mg/dL


 


Est Cr Clr Drug Dosing   86.56  mL/min


 


Estimated GFR (MDRD)   > 60.0  ml/min


 


Glucose   99  ()  mg/dL


 


Calcium   8.2 L  (8.5-10.1)  mg/dL


 


Phosphorus   3.2  (2.6-4.7)  mg/dL


 


Magnesium   2.1  (1.8-2.4)  mg/dL











Med Orders - Current: 


                               Current Medications





Bisacodyl (Bisacodyl 5 Mg Tab)  5 mg PO Q12H [facility]


   Last Admin: 07/28/21 04:00 Dose:  5 mg


   Documented by: 


Enoxaparin Sodium (Enoxaparin 40 Mg/0.4 Ml Syringe)  40 mg SUBCUT Q24H [facility]


   Last Admin: 07/28/21 09:03 Dose:  40 mg


   Documented by: 


Hydromorphone HCl (Hydromorphone 1 Mg/Ml Syringe)  0.5 mg IVPUSH Q1H PRN


   PRN Reason: Pain (severe 7-10)


   Last Admin: 07/23/21 16:50 Dose:  0.5 mg


   Documented by: 


Pantoprazole Sodium 40 mg/ (Sodium Chloride)  10 mls @ 300 mls/hr IV DAILY [facility]


   Last Admin: 07/28/21 09:03 Dose:  300 mls/hr


   Documented by: 


Piperacillin Sod/Tazobactam (Sod 3.375 gm/ Sodium Chloride)  100 mls @ 200 

mls/hr IV Q6H [facility]


   Last Admin: 07/28/21 03:11 Dose:  200 mls/hr


   Documented by: 


Vancomycin HCl 1.5 gm/ Premix  300 mls @ 200 mls/hr IV Q12H [facility]


   Last Admin: 07/28/21 04:00 Dose:  200 mls/hr


   Documented by: 


Magnesium Oxide (Magnesium Oxide 400 Mg Tab)  400 mg PO ONETIME ONE


   Stop: 07/28/21 12:01


Multivitamins/Minerals/Vitamin C (Multivitamin Tab)  1 tab PO BEDTIME [facility]


   Last Admin: 07/27/21 21:30 Dose:  1 tab


   Documented by: 


Ondansetron HCl (Ondansetron 4 Mg/2 Ml Sdv)  4 mg IVPUSH Q6H PRN


   PRN Reason: Nausea/Vomiting


Oxycodone/Acetaminophen (Acetaminophen/Oxycodone 325-5 Mg Tab)  2 tab PO Q4H PRN


   PRN Reason: Pain (severe 7-10)


Polyethylene Glycol (Polyethylene Glycol 3350 Powder 17 Gm Packet)  17 gm PO 

DAILY [facility]


   Last Admin: 07/28/21 09:02 Dose:  17 gm


   Documented by: 


Sodium Chloride (Sodium Chloride 0.9% 10 Ml Syringe)  10 ml FLUSH ASDIRECTED PRN


   PRN Reason: Keep Vein Open


   Last Admin: 07/21/21 17:41 Dose:  10 ml


   Documented by: 


Sodium Chloride (Sodium Chloride 0.9% 2.5 Ml Syringe)  2.5 ml FLUSH ASDIRECTED 

PRN


   PRN Reason: Keep Vein Open


   Last Admin: 07/21/21 17:41 Dose:  2.5 ml


   Documented by: 


Sodium Chloride (Sodium Chloride 0.9% 10 Ml Syringe)  10 ml FLUSH ASDIRECTED PRN


   PRN Reason: Keep Vein Open


Sodium Chloride (Sodium Chloride 0.9% 2.5 Ml Syringe)  2.5 ml FLUSH ASDIRECTED 

PRN


   PRN Reason: Keep Vein Open


Sodium Chloride (Sodium Chloride 0.9% 10 Ml Sdv)  10 ml IV ASDIRECTED PRN


   PRN Reason: IV Use


Vancomycin HCl (Pharmacy To Dose - Vancomycin)  1 dose .XX ASDIRECTED [facility]





Discontinued Medications





Albuterol (Albuterol 0.083% 2.5 Mg/3 Ml Neb Soln)  2.5 mg NEB ONETIME PRN


   PRN Reason: Wheezing


Bupivacaine HCl (Bupivacaine 0.5% 30 Ml Sdv) Confirm Administered Dose 60 ml 

.ROUTE .STK-MED ONE


   Stop: 07/22/21 11:29


Bupivacaine HCl (Bupivacaine 0.5% 30 Ml Sdv) Confirm Administered Dose 30 ml 

.ROUTE .STK-MED ONE


   Stop: 07/22/21 12:50


Cefazolin Sodium (Cefazolin 1 Gm Vial) Confirm Administered Dose 1 gm .ROUTE 

.STK-MED ONE


   Stop: 07/22/21 12:50


Cefazolin Sodium (Cefazolin 1 Gm Vial) Confirm Administered Dose 1 gm .ROUTE 

.STK-MED ONE


   Stop: 07/22/21 13:58


Cyclobenzaprine HCl (Cyclobenzaprine 5 Mg Tab)  5 mg PO TID [facility]


   Last Admin: 07/28/21 06:00 Dose:  Not Given


   Documented by: 


Desflurane (Desflurane 240 Ml Bottle) Confirm Administered Dose 240 ml .ROUTE 

.STK-MED ONE


   Stop: 07/22/21 16:30


Dexmedetomidine HCl (Dexmedetomidine 200 Mcg/2 Ml Sdv) Confirm Administered Dose

200 mcg .ROUTE .STK-MED ONE


   Stop: 07/22/21 12:13


Droperidol (Droperidol 5 Mg/2 Ml Sdv)  0.625 mg IVPUSH ONETIME PRN


   PRN Reason: Nausea/Vomiting


Fentanyl (Fentanyl 250 Mcg/5 Ml Sdv) Confirm Administered Dose 250 mcg .ROUTE 

.STK-MED ONE


   Stop: 07/22/21 12:11


Fentanyl (Fentanyl 100 Mcg/2 Ml Sdv) Confirm Administered Dose 100 mcg .ROUTE 

.STK-MED ONE


   Stop: 07/22/21 16:30


Fentanyl (Fentanyl 100 Mcg/2 Ml Sdv)  50 mcg IVPUSH Q5M PRN


   PRN Reason: Pain (mild 1-3)


Glycopyrrolate (Glycopyrrolate 0.2 Mg/Ml Sdv) Confirm Administered Dose 0.2 mg 

.ROUTE .STK-MED ONE


   Stop: 07/22/21 12:13


Hydromorphone HCl (Hydromorphone 2 Mg/Ml Syringe) Confirm Administered Dose 2 mg

.ROUTE .STK-MED ONE


   Stop: 07/22/21 14:13


Hydromorphone HCl (Hydromorphone 2 Mg/Ml Syringe)  1 mg IVPUSH Q10M PRN


   PRN Reason: Pain (moderate 4-6)


Sodium Chloride (Normal Saline)  1,000 mls @ 125 mls/hr IV STAT ONE


   Stop: 07/22/21 01:05


   Last Admin: 07/21/21 17:40 Dose:  125 mls/hr


   Documented by: 


Piperacillin Sod/Tazobactam (Sod 4.5 gm/ Sodium Chloride)  100 mls @ 100 mls/hr 

IV ONETIME ONE


   Stop: 07/21/21 20:00


   Last Admin: 07/21/21 19:31 Dose:  Not Given


   Documented by: 


Piperacillin Sod/Tazobactam (Sod 3.375 gm/ Sodium Chloride)  50 mls @ 100 mls/hr

IV ONETIME ONE


   Stop: 07/21/21 19:31


   Last Admin: 07/21/21 19:29 Dose:  100 mls/hr


   Documented by: 


Lactated Ringer's (Ringers, Lactated)  1,000 mls @ 1,000 mls/hr IV ASDIRECTED 

[facility]


   Last Admin: 07/23/21 05:25 Dose:  150 mls/hr


   Documented by: 


Piperacillin Sod/Tazobactam (Sod 3.375 gm/ Sodium Chloride)  50 mls @ 100 mls/hr

IV Q6H [facility]


   Last Admin: 07/25/21 13:50 Dose:  100 mls/hr


   Documented by: 


Acetaminophen (Ofirmev 1000 Mg/100 Ml) Confirm Administered Dose 100 mls @ as 

directed .ROUTE .STK-MED ONE


   Stop: 07/22/21 12:57


Sodium Chloride (Normal Saline) Confirm Administered Dose 20 mls @ as directed 

.ROUTE .STK-MED ONE


   Stop: 07/22/21 14:20


Acetaminophen 1,000 mg/ Premix  100 mls @ 400 mls/hr IV Q6H PRN


   PRN Reason: Pain


Lactated Ringer's (Ringers, Lactated)  1,000 mls @ 1,000 mls/hr IV ASDIRECTED 

ONE


   Stop: 07/23/21 00:59


   Last Admin: 07/23/21 00:00 Dose:  1,000 mls/hr


   Documented by: 


Piperacillin Sod/Tazobactam (Sod 3.375 gm/ Sodium Chloride)  50 mls @ 100 mls/hr

IV Q6H [facility]


   Last Admin: 07/26/21 14:59 Dose:  Not Given


   Documented by: 


Iopamidol (Iopamidol 755 Mg/Ml 100 Ml Bottle)  100 ml IVPUSH ONETIME STA


   Stop: 07/21/21 19:09


   Last Admin: 07/22/21 08:18 Dose:  Not Given


   Documented by: 


Ketamine HCl (Ketamine 500 Mg/10 Ml Mdv) Confirm Administered Dose 500 mg .ROUTE

.STK-MED ONE


   Stop: 07/22/21 12:14


Ketorolac Tromethamine (Ketorolac 30 Mg/Ml Sdv) Confirm Administered Dose 30 mg 

.ROUTE .STK-MED ONE


   Stop: 07/22/21 12:14


Ketorolac Tromethamine (Ketorolac 30 Mg/Ml Sdv)  30 mg IVPUSH Q6H [facility]


   Stop: 07/27/21 23:59


   Last Admin: 07/25/21 05:15 Dose:  30 mg


   Documented by: 


Ketorolac Tromethamine (Ketorolac 30 Mg/Ml Sdv)  30 mg IVPUSH Q6H PRN


   PRN Reason: Abdominal Pain


   Stop: 07/27/21 23:59


   Last Admin: 07/25/21 20:51 Dose:  30 mg


   Documented by: 


Lidocaine (Lidocaine 2% 5 Ml Sdv) Confirm Administered Dose 5 ml .ROUTE .STK-MED

ONE


   Stop: 07/22/21 12:13


Melatonin (Melatonin 3 Mg Tab)  3 mg PO BEDTIME ONE


   Stop: 07/25/21 20:41


   Last Admin: 07/25/21 21:55 Dose:  3 mg


   Documented by: 


Metoclopramide HCl (Metoclopramide 10 Mg/2 Ml Sdv) Confirm Administered Dose 10 

mg .ROUTE .[employer]-MED ONE


   Stop: 07/22/21 12:13


Midazolam HCl (Midazolam 1 Mg/Ml 2 Ml Sdv) Confirm Administered Dose 2 mg .ROUTE

.[employer]-MED ONE


   Stop: 07/22/21 12:24


Morphine Sulfate (Morphine 2 Mg/Ml Syringe)  2 mg IVPUSH Q10M PRN


   PRN Reason: Pain (severe 7-10)


Naloxone HCl (Naloxone 0.4 Mg/Ml Syringe)  0.1 mg IVPUSH ASDIRECTED PRN


   PRN Reason: Respiratory Depression


Ondansetron HCl (Ondansetron 4 Mg/2 Ml Sdv) Confirm Administered Dose 4 mg 

.ROUTE .STK-MED ONE


   Stop: 07/22/21 12:13


Ondansetron HCl (Ondansetron 4 Mg/2 Ml Sdv) Confirm Administered Dose 4 mg 

.ROUTE .[employer]-MED ONE


   Stop: 07/22/21 14:06


Ondansetron HCl (Ondansetron 4 Mg/2 Ml Sdv)  4 mg IVPUSH ONETIME PRN


   PRN Reason: Nausea/Vomiting


Propofol (Propofol 200 Mg/20 Ml Sdv) Confirm Administered Dose 400 mg .ROUTE 

.[employer]-MED ONE


   Stop: 07/22/21 12:17


Propofol (Propofol 200 Mg/20 Ml Sdv) Confirm Administered Dose 200 mg .ROUTE 

.STK-MED ONE


   Stop: 07/22/21 15:06


Rocuronium Bromide (Rocuronium Bromide 50 Mg/5 Ml Syringe) Confirm Administered 

Dose 50 mg .ROUTE .[employer]-MED ONE


   Stop: 07/22/21 12:13


Rocuronium Bromide (Rocuronium Bromide 50 Mg/5 Ml Syringe) Confirm Administered 

Dose 50 mg .ROUTE .STK-MED ONE


   Stop: 07/22/21 14:05


Sugammadex Sodium (Sugammadex Sodium 200 Mg/2 Ml Vial) Confirm Administered Dose

200 mg .ROUTE .STK-MED ONE


   Stop: 07/22/21 12:13











- Exam


Wound/Incisions: Other (erythema, warmth around lower abdominal incision. This 

has not gotten worse. )


General: Alert, Oriented, Cooperative


HEENT: Pupils Equal, Pupils Reactive


Lungs: Normal Respiratory Effort


Cardiovascular: Regular Rate


GI/Abdominal Exam: Soft, Non-Tender, No Mass, Distended (mild distension which 

is improving)


Skin: Warm, Dry, Intact


Neurological: No New Focal Deficit


Psy/Mental Status: Alert, Normal Affect, Normal Mood





Sepsis Event Note





- Evaluation


Sepsis Screening Result: No Definite Risk





- Focused Exam


Vital Signs: 


                                   Vital Signs











  Temp Pulse Resp BP Pulse Ox


 


 07/28/21 08:00  36.5 C  84  16  112/71  95


 


 07/28/21 04:00  36.4 C  87  17  95/56 L  96


 


 07/28/21 00:00  36.6 C  86  17  113/63  96














- Problem List & Annotations


(1) Perforation of sigmoid colon due to diverticulitis


SNOMED Code(s): 0995870392513424


   Code(s): K57.20 - DVTRCLI OF LG INT W PERFORATION AND ABSCESS W/O BLEEDING   

Status: Acute   Current Visit: No   





(2) Surgical wound infection


SNOMED Code(s): 04832075, 730042073


   Code(s): T81.49XA - INFECTION FOLLOWING A PROCEDURE, OTHER SURGICAL SITE, 

INIT   Status: Acute   Current Visit: Yes   





- Problem List Review


Problem List Initiated/Reviewed/Updated: Yes





- My Orders


Last 24 Hours: 


                               Active Orders 24 hr











 Category Date Time Status


 


 VANCOMYCIN TROUGH [CHEM] Routine Lab  07/29/21 03:45 Ordered


 


 Magnesium Oxide Med  07/28/21 12:00 Once





 400 mg PO ONETIME ONE   


 


 Pharmacy to Dose - Vancomycin Med  07/27/21 16:30 Active





 1 dose .XX ASDIRECTED   


 


 VANCOmycin 1.5 GM/300 ML 1.5 gm Med  07/27/21 16:30 Active





 Premix Bag 1 bag   





 IV Q12H   


 


 bisacodyL [Dulcolax] Med  07/27/21 16:30 Active





 5 mg PO Q12H   








                                Medication Orders





Bisacodyl (Bisacodyl 5 Mg Tab)  5 mg PO Q12H [facility]


   Last Admin: 07/28/21 04:00  Dose: 5 mg


   Documented by: JEROME


   Admin: 07/27/21 17:19  Dose: 5 mg


   Documented by: LILLIAN


Enoxaparin Sodium (Enoxaparin 40 Mg/0.4 Ml Syringe)  40 mg SUBCUT Q24H [facility]


   Last Admin: 07/28/21 09:03  Dose: 40 mg


   Documented by: GER


   Admin: 07/27/21 09:32  Dose: 40 mg


   Documented by: LILLIAN


   Admin: 07/26/21 09:26  Dose: 40 mg


   Documented by: CLINT


   Admin: 07/25/21 08:08  Dose: 40 mg


   Documented by: VICKI


   Admin: 07/24/21 09:47  Dose: 40 mg


   Documented by: LILLIAN


   Admin: 07/23/21 08:54  Dose: 40 mg


   Documented by: RONY


Hydromorphone HCl (Hydromorphone 1 Mg/Ml Syringe)  0.5 mg IVPUSH Q1H PRN


   PRN Reason: Pain (severe 7-10)


   Last Admin: 07/23/21 16:50  Dose: 0.5 mg


   Documented by: RONY


Pantoprazole Sodium 40 mg/ (Sodium Chloride)  10 mls @ 300 mls/hr IV DAILY [facility]


   Last Admin: 07/28/21 09:03  Dose: 300 mls/hr


   Documented by: GER


   Infusion: 07/27/21 09:34  Dose: 300 mls/hr


   Documented by: GER


   Admin: 07/27/21 09:32  Dose: 300 mls/hr


   Documented by: LILLIAN


   Infusion: 07/26/21 09:27  Dose: 300 mls/hr


   Documented by: LILLIAN


   Admin: 07/26/21 09:25  Dose: 300 mls/hr


   Documented by: CLINT


   Infusion: 07/25/21 08:07  Dose: 300 mls/hr


   Documented by: CLINT


   Admin: 07/25/21 08:05  Dose: 300 mls/hr


   Documented by: VICKI


   Infusion: 07/24/21 09:44  Dose: 300 mls/hr


   Documented by: VICKI


   Admin: 07/24/21 09:42  Dose: 300 mls/hr


   Documented by: ANDREIAPRI


   Infusion: 07/23/21 08:56  Dose: 300 mls/hr


   Documented by: ANDREIAPRI


   Admin: 07/23/21 08:54  Dose: 300 mls/hr


   Documented by: RONY


   Infusion: 07/22/21 08:31  Dose: 300 mls/hr


   Documented by: JAGRUTIOMAS


   Admin: 07/22/21 08:29  Dose: 300 mls/hr


   Documented by: DAISY


Piperacillin Sod/Tazobactam (Sod 3.375 gm/ Sodium Chloride)  100 mls @ 200 

mls/hr IV Q6H [facility]


   Last Admin: 07/28/21 03:11  Dose: 200 mls/hr


   Documented by: JEROME


   Infusion: 07/27/21 22:41  Dose: 200 mls/hr


   Documented by: JEROME


   Admin: 07/27/21 22:11  Dose: 200 mls/hr


   Documented by: JEROME


   Infusion: 07/27/21 17:02  Dose: 200 mls/hr


   Documented by: JEROME


   Admin: 07/27/21 16:32  Dose: 200 mls/hr


   Documented by: ANDREIAPRI


   Infusion: 07/27/21 10:36  Dose: 200 mls/hr


   Documented by: ANDREIAPRI


   Admin: 07/27/21 10:06  Dose: 200 mls/hr


   Documented by: ANDREIAPRI


   Infusion: 07/27/21 04:44  Dose: 200 mls/hr


   Documented by: ANDREIAPRI


   Admin: 07/27/21 04:14  Dose: 200 mls/hr


   Documented by: JEROME


   Infusion: 07/26/21 22:09  Dose: 200 mls/hr


   Documented by: STORMYMAR


   Admin: 07/26/21 21:39  Dose: 200 mls/hr


   Documented by: STORMYMAR


   Infusion: 07/26/21 16:24  Dose: 200 mls/hr


   Documented by: STORMYMAR


   Admin: 07/26/21 15:54  Dose: 200 mls/hr


   Documented by: CLINT


Vancomycin HCl 1.5 gm/ Premix  300 mls @ 200 mls/hr IV Q12H [facility]


   Last Admin: 07/28/21 04:00  Dose: 200 mls/hr


   Documented by: JEROME


   Infusion: 07/27/21 19:21  Dose: 200 mls/hr


   Documented by: JEROME


   Admin: 07/27/21 17:51  Dose: 200 mls/hr


   Documented by: LILLIAN


Magnesium Oxide (Magnesium Oxide 400 Mg Tab)  400 mg PO ONETIME ONE


   Stop: 07/28/21 12:01


Multivitamins/Minerals/Vitamin C (Multivitamin Tab)  1 tab PO BEDTIME [facility]


   Last Admin: 07/27/21 21:30  Dose: 1 tab


   Documented by: JEROME


   Admin: 07/26/21 21:38  Dose: 1 tab


   Documented by: JEROME


Ondansetron HCl (Ondansetron 4 Mg/2 Ml Sdv)  4 mg IVPUSH Q6H PRN


   PRN Reason: Nausea/Vomiting


Oxycodone/Acetaminophen (Acetaminophen/Oxycodone 325-5 Mg Tab)  2 tab PO Q4H PRN


   PRN Reason: Pain (severe 7-10)


Polyethylene Glycol (Polyethylene Glycol 3350 Powder 17 Gm Packet)  17 gm PO 

DAILY [facility]


   Last Admin: 07/28/21 09:02  Dose: 17 gm


   Documented by: GER


   Admin: 07/27/21 09:32  Dose: 17 gm


   Documented by: LILLIAN


   Admin: 07/26/21 10:07  Dose: 17 gm


   Documented by: CLINT


Sodium Chloride (Sodium Chloride 0.9% 10 Ml Syringe)  10 ml FLUSH ASDIRECTED PRN


   PRN Reason: Keep Vein Open


   Last Admin: 07/21/21 17:41  Dose: 10 ml


   Documented by: MILY


Sodium Chloride (Sodium Chloride 0.9% 2.5 Ml Syringe)  2.5 ml FLUSH ASDIRECTED 

PRN


   PRN Reason: Keep Vein Open


   Last Admin: 07/21/21 17:41  Dose: 2.5 ml


   Documented by: MILY


Sodium Chloride (Sodium Chloride 0.9% 10 Ml Syringe)  10 ml FLUSH ASDIRECTED PRN


   PRN Reason: Keep Vein Open


Sodium Chloride (Sodium Chloride 0.9% 2.5 Ml Syringe)  2.5 ml FLUSH ASDIRECTED 

PRN


   PRN Reason: Keep Vein Open


Sodium Chloride (Sodium Chloride 0.9% 10 Ml Sdv)  10 ml IV ASDIRECTED PRN


   PRN Reason: IV Use


Vancomycin HCl (Pharmacy To Dose - Vancomycin)  1 dose .XX ASDIRECTED [facility]











- Plan


Plan                        (Free Text/Narrative):: 


Yesterday after taking the patient's dressings off I noticed some redness along 

his lower midline incision. He was afebrile. When I came to check on him later 

in the afternoon the redness had spread and had become warm. I added IV 

vancomycin for surgical site infection. This morning the redness has not gotten 

worse. I feel no evidence of fluctuance along the wound. I checked a CBC this 

morning and he does have a leukocytosis of 11,000. Will continue IV Zosyn and IV

 vancomycin for now. We'll continue to monitor the wound for signs of undrained 

fluid collections. I explained that since he had a perforated diverticulitis he 

was at risk for developing a midline wound infection. 


He continues to pass flatus. His abdomen is softer and less distended. He has 

not had a bowel movement yet. I started Dulcolax twice a day yesterday. We'll 

likely give the patient some magnesium today to see if this stimulate a bowel 

movement. His appetite remains good and he denies any nausea.

## 2021-07-29 RX ADMIN — SODIUM CHLORIDE SCH MLS/HR: 9 INJECTION, SOLUTION INTRAMUSCULAR; INTRAVENOUS; SUBCUTANEOUS at 10:36

## 2021-07-29 RX ADMIN — VANCOMYCIN SCH MLS/HR: 1.5 INJECTION, SOLUTION INTRAVENOUS at 16:50

## 2021-07-29 RX ADMIN — VANCOMYCIN SCH MLS/HR: 1.5 INJECTION, SOLUTION INTRAVENOUS at 05:29

## 2021-07-29 NOTE — CT
INDICATION:



Status post sigmoidectomy and appendectomy 1 week ago with redness along 

incision. Rule out abscess along lower abdominal skin incision and rule out 

abdominal abscess.



TECHNIQUE:



CT of the abdomen and pelvis with 100 cc Isovue 370 IV contrast. Coronal 

and sagittal reconstructions.



COMPARISON:



CT of the abdomen and pelvis 07/21/2021. 



FINDINGS:



Stable tiny low-attenuation lesion in the inferior right hepatic lobe which 

is too small characterize. The gallbladder, spleen, pancreas, and adrenal 

glands are negative. No biliary dilation. Hepatic and portal veins are 

patent.



Symmetric enhancement of the kidneys. No hydronephrosis or ureteral 

dilation. No obstructing urinary calculi. The bladder prostate gland are 

normal in appearance.



Interval appendectomy. There has reportedly been a sigmoidectomy performed, 

however the sigmoid colon has a similar course as the prior exam and no 

suture lines are seen. Resolution of previously seen inflammatory changes 

of the sigmoid colon. Persistent mild wall thickening of nondistended loops 

of small bowel in the right lower quadrant which is likely reactive. There 

is mild dilation of nondistended upstream loops of small bowel in the mid 

abdomen measuring up to 3.5 cm in diameter without a discrete transition 

point. 



There is a small partially loculated 0.8 x 2.2 cm fluid collection in the 

right lower quadrant (series 201, image 120). No internal gas locules. This 

is not of drainable size. No other intra-abdominal fluid collection 

identified. Resolution of previously seen free air. Postoperative changes 

in the midline anterior abdominal wall. There is subcutaneous fat stranding 

and tiny gas locules about the incision without a discrete fluid 

collection.



Aortoiliac vascular calcifications. No lymphadenopathy. Degenerative 

changes of the spine. The lung bases are clear.



IMPRESSION:



1. Interval appendectomy. There has reportedly been a sigmoidectomy but 

this is not apparent. Resolution of previously seen inflammatory changes of 

the sigmoid colon. 



2. Persistent mild wall thickening of nondistended loops of small bowel in 

the right lower quadrant, with mildly dilated upstream small bowel loops in 

the mid abdomen. This is likely reactive. No discrete transition point. 



3. Small partially loculated 0.8 x 2.2 cm fluid collection in the right 

lower quadrant may be postoperative in nature. No definite signs of 

infection. This is not of drainable size. 



4. Postoperative changes of the midline anterior abdominal wall with 

associated subcutaneous fat stranding and tiny gas locules about the 

incision. No discrete fluid collection.



Please note that all CT scans at this facility use dose modulation, 

iterative reconstruction, and/or weight-based dosing when appropriate to 

reduce radiation dose to as low as reasonably achievable.



Dictated by Viry Kirkland MD @ 7/29/2021 10:02:26 AM



Signed by Dr. Viry Kirkland @ Jul 29 2021 10:02AM

## 2021-07-29 NOTE — [id number].[id number]
- General Info


Date of Service: 07/29/21


Date of Surgery/Procedure: 07/22/21


POD#: 7





- Review of Systems


General: Reports: No Symptoms


HEENT: Reports: No Symptoms


Pulmonary: Reports: No Symptoms


Cardiovascular: Reports: No Symptoms


Gastrointestinal: Reports: Other (Had BM x 2 this morning )


Genitourinary: Reports: No Symptoms


Musculoskeletal: Reports: No Symptoms


Skin: Reports: Other (Erythema tenderness and warmth around incision. Purulent 

material draining from open area beneath umbilicus. )





- Patient Data


Vitals - Most Recent: 


                                Last Vital Signs











Temp  36.0 C L  07/29/21 08:00


 


Pulse  83   07/29/21 08:00


 


Resp  20   07/29/21 08:00


 


BP  118/78   07/29/21 08:00


 


Pulse Ox  95   07/29/21 08:00











Weight - Most Recent: 103.555 kg


I&O - Last 24 Hours: 


                                 Intake & Output











 07/28/21 07/29/21 07/29/21





 22:59 06:59 14:59


 


Intake Total 410 800 


 


Output Total 1350 500 


 


Balance -940 300 











Lab Results Last 24 Hrs: 


                         Laboratory Results - last 24 hr











  07/29/21 07/29/21 Range/Units





  04:10 04:10 


 


WBC   9.87  (4.0-11.0)  K/uL


 


RBC   4.29 L  (4.50-5.90)  M/uL


 


Hgb   13.5  (13.0-17.0)  g/dL


 


Hct   38.8  (38.0-50.0)  %


 


MCV   90.4  (80.0-98.0)  fL


 


MCH   31.5  (27.0-32.0)  pg


 


MCHC   34.8  (31.0-37.0)  g/dL


 


RDW Std Deviation   42.2  (28.0-62.0)  fl


 


RDW Coeff of Anne   13  (11.0-15.0)  %


 


Plt Count   400  (150-400)  K/uL


 


MPV   9.90  (7.40-12.00)  fL


 


Add Manual Diff   YES  


 


Neutrophils % (Manual)   76  (48.0-80.0)  %


 


Lymphocytes % (Manual)   18  (16.0-40.0)  %


 


Monocytes % (Manual)   3  (0.0-15.0)  %


 


Eosinophils % (Manual)   2  (0.0-7.0)  %


 


Basophils % (Manual)   1  (0.0-1.5)  %


 


Nucleated RBC %   0.0  /100WBC


 


Absolute Seg Neuts   7.5 H  (1.4-5.7)  


 


Lymphocytes # (Manual)   1.8  (0.6-2.4)  


 


Monocytes # (Manual)   0.3  (0.0-0.8)  


 


Eosinophils # (Manual)   0.2  (0.0-0.7)  


 


Basophils # (Manual)   0.1  (0.0-0.1)  


 


Nucleated RBCs #   0  K/uL


 


Vancomycin Trough  12.0 H   (5.0-10.0)  ug/mL











Med Orders - Current: 


                               Current Medications





Bisacodyl (Bisacodyl 5 Mg Tab)  5 mg PO Q12H [facility]


   Last Admin: 07/29/21 04:18 Dose:  5 mg


   Documented by: 


Enoxaparin Sodium (Enoxaparin 40 Mg/0.4 Ml Syringe)  40 mg SUBCUT Q24H [facility]


   Last Admin: 07/29/21 10:43 Dose:  40 mg


   Documented by: 


Hydromorphone HCl (Hydromorphone 1 Mg/Ml Syringe)  0.5 mg IVPUSH Q1H PRN


   PRN Reason: Pain (severe 7-10)


   Last Admin: 07/23/21 16:50 Dose:  0.5 mg


   Documented by: 


Pantoprazole Sodium 40 mg/ (Sodium Chloride)  10 mls @ 300 mls/hr IV DAILY [facility]


   Last Admin: 07/29/21 10:36 Dose:  300 mls/hr


   Documented by: 


Piperacillin Sod/Tazobactam (Sod 3.375 gm/ Sodium Chloride)  100 mls @ 200 

mls/hr IV Q6H [facility]


   Last Admin: 07/29/21 10:44 Dose:  200 mls/hr


   Documented by: 


Vancomycin HCl 1.5 gm/ Premix  300 mls @ 200 mls/hr IV Q12H [facility]


   Last Admin: 07/29/21 05:29 Dose:  200 mls/hr


   Documented by: 


Multivitamins/Minerals/Vitamin C (Multivitamin Tab)  1 tab PO BEDTIME [facility]


   Last Admin: 07/28/21 21:38 Dose:  1 tab


   Documented by: 


Ondansetron HCl (Ondansetron 4 Mg/2 Ml Sdv)  4 mg IVPUSH Q6H PRN


   PRN Reason: Nausea/Vomiting


Oxycodone/Acetaminophen (Acetaminophen/Oxycodone 325-5 Mg Tab)  2 tab PO Q4H PRN


   PRN Reason: Pain (severe 7-10)


Polyethylene Glycol (Polyethylene Glycol 3350 Powder 17 Gm Packet)  17 gm PO 

DAILY [facility]


   Last Admin: 07/29/21 10:36 Dose:  17 gm


   Documented by: 


Sodium Chloride (Sodium Chloride 0.9% 10 Ml Syringe)  10 ml FLUSH ASDIRECTED PRN


   PRN Reason: Keep Vein Open


   Last Admin: 07/21/21 17:41 Dose:  10 ml


   Documented by: 


Sodium Chloride (Sodium Chloride 0.9% 2.5 Ml Syringe)  2.5 ml FLUSH ASDIRECTED 

PRN


   PRN Reason: Keep Vein Open


   Last Admin: 07/21/21 17:41 Dose:  2.5 ml


   Documented by: 


Sodium Chloride (Sodium Chloride 0.9% 10 Ml Syringe)  10 ml FLUSH ASDIRECTED PRN


   PRN Reason: Keep Vein Open


Sodium Chloride (Sodium Chloride 0.9% 2.5 Ml Syringe)  2.5 ml FLUSH ASDIRECTED 

PRN


   PRN Reason: Keep Vein Open


Sodium Chloride (Sodium Chloride 0.9% 10 Ml Sdv)  10 ml IV ASDIRECTED PRN


   PRN Reason: IV Use


Vancomycin HCl (Pharmacy To Dose - Vancomycin)  1 dose .XX ASDIRECTED [facility]





Discontinued Medications





Albuterol (Albuterol 0.083% 2.5 Mg/3 Ml Neb Soln)  2.5 mg NEB ONETIME PRN


   PRN Reason: Wheezing


Bupivacaine HCl (Bupivacaine 0.5% 30 Ml Sdv) Confirm Administered Dose 60 ml 

.ROUTE .STK-MED ONE


   Stop: 07/22/21 11:29


Bupivacaine HCl (Bupivacaine 0.5% 30 Ml Sdv) Confirm Administered Dose 30 ml 

.ROUTE .STK-MED ONE


   Stop: 07/22/21 12:50


Cefazolin Sodium (Cefazolin 1 Gm Vial) Confirm Administered Dose 1 gm .ROUTE 

.STK-MED ONE


   Stop: 07/22/21 12:50


Cefazolin Sodium (Cefazolin 1 Gm Vial) Confirm Administered Dose 1 gm .ROUTE 

.STK-MED ONE


   Stop: 07/22/21 13:58


Cyclobenzaprine HCl (Cyclobenzaprine 5 Mg Tab)  5 mg PO TID [facility]


   Last Admin: 07/28/21 06:00 Dose:  Not Given


   Documented by: 


Desflurane (Desflurane 240 Ml Bottle) Confirm Administered Dose 240 ml .ROUTE 

.STK-MED ONE


   Stop: 07/22/21 16:30


Dexmedetomidine HCl (Dexmedetomidine 200 Mcg/2 Ml Sdv) Confirm Administered Dose

200 mcg .ROUTE .STK-MED ONE


   Stop: 07/22/21 12:13


Droperidol (Droperidol 5 Mg/2 Ml Sdv)  0.625 mg IVPUSH ONETIME PRN


   PRN Reason: Nausea/Vomiting


Fentanyl (Fentanyl 250 Mcg/5 Ml Sdv) Confirm Administered Dose 250 mcg .ROUTE 

.STK-MED ONE


   Stop: 07/22/21 12:11


Fentanyl (Fentanyl 100 Mcg/2 Ml Sdv) Confirm Administered Dose 100 mcg .ROUTE 

.STK-MED ONE


   Stop: 07/22/21 16:30


Fentanyl (Fentanyl 100 Mcg/2 Ml Sdv)  50 mcg IVPUSH Q5M PRN


   PRN Reason: Pain (mild 1-3)


Glycopyrrolate (Glycopyrrolate 0.2 Mg/Ml Sdv) Confirm Administered Dose 0.2 mg 

.ROUTE .STK-MED ONE


   Stop: 07/22/21 12:13


Hydromorphone HCl (Hydromorphone 2 Mg/Ml Syringe) Confirm Administered Dose 2 mg

.ROUTE .STK-MED ONE


   Stop: 07/22/21 14:13


Hydromorphone HCl (Hydromorphone 2 Mg/Ml Syringe)  1 mg IVPUSH Q10M PRN


   PRN Reason: Pain (moderate 4-6)


Sodium Chloride (Normal Saline)  1,000 mls @ 125 mls/hr IV STAT ONE


   Stop: 07/22/21 01:05


   Last Admin: 07/21/21 17:40 Dose:  125 mls/hr


   Documented by: 


Piperacillin Sod/Tazobactam (Sod 4.5 gm/ Sodium Chloride)  100 mls @ 100 mls/hr 

IV ONETIME ONE


   Stop: 07/21/21 20:00


   Last Admin: 07/21/21 19:31 Dose:  Not Given


   Documented by: 


Piperacillin Sod/Tazobactam (Sod 3.375 gm/ Sodium Chloride)  50 mls @ 100 mls/hr

IV ONETIME ONE


   Stop: 07/21/21 19:31


   Last Admin: 07/21/21 19:29 Dose:  100 mls/hr


   Documented by: 


Lactated Ringer's (Ringers, Lactated)  1,000 mls @ 1,000 mls/hr IV ASDIRECTED S




   Last Admin: 07/23/21 05:25 Dose:  150 mls/hr


   Documented by: 


Piperacillin Sod/Tazobactam (Sod 3.375 gm/ Sodium Chloride)  50 mls @ 100 mls/hr

IV Q6H [facility]


   Last Admin: 07/25/21 13:50 Dose:  100 mls/hr


   Documented by: 


Acetaminophen (Ofirmev 1000 Mg/100 Ml) Confirm Administered Dose 100 mls @ as 

directed .ROUTE .STK-MED ONE


   Stop: 07/22/21 12:57


Sodium Chloride (Normal Saline) Confirm Administered Dose 20 mls @ as directed 

.ROUTE .STK-MED ONE


   Stop: 07/22/21 14:20


Acetaminophen 1,000 mg/ Premix  100 mls @ 400 mls/hr IV Q6H PRN


   PRN Reason: Pain


Lactated Ringer's (Ringers, Lactated)  1,000 mls @ 1,000 mls/hr IV ASDIRECTED 

ONE


   Stop: 07/23/21 00:59


   Last Admin: 07/23/21 00:00 Dose:  1,000 mls/hr


   Documented by: 


Piperacillin Sod/Tazobactam (Sod 3.375 gm/ Sodium Chloride)  50 mls @ 100 mls/hr

IV Q6H [facility]


   Last Admin: 07/26/21 14:59 Dose:  Not Given


   Documented by: 


Iopamidol (Iopamidol 755 Mg/Ml 100 Ml Bottle)  100 ml IVPUSH ONETIME STA


   Stop: 07/21/21 19:09


   Last Admin: 07/22/21 08:18 Dose:  Not Given


   Documented by: 


Iopamidol (Iopamidol 755 Mg/Ml 500 Ml Multipack Bottle)  100 ml IVPUSH ONETIME 

[facility]


   Stop: 07/29/21 09:04


   Last Admin: 07/29/21 09:41 Dose:  100 ml


   Documented by: 


Ketamine HCl (Ketamine 500 Mg/10 Ml Mdv) Confirm Administered Dose 500 mg .ROUTE

.STK-MED ONE


   Stop: 07/22/21 12:14


Ketorolac Tromethamine (Ketorolac 30 Mg/Ml Sdv) Confirm Administered Dose 30 mg 

.ROUTE .STK-MED ONE


   Stop: 07/22/21 12:14


Ketorolac Tromethamine (Ketorolac 30 Mg/Ml Sdv)  30 mg IVPUSH Q6H [facility]


   Stop: 07/27/21 23:59


   Last Admin: 07/25/21 05:15 Dose:  30 mg


   Documented by: 


Ketorolac Tromethamine (Ketorolac 30 Mg/Ml Sdv)  30 mg IVPUSH Q6H PRN


   PRN Reason: Abdominal Pain


   Stop: 07/27/21 23:59


   Last Admin: 07/25/21 20:51 Dose:  30 mg


   Documented by: 


Lidocaine (Lidocaine 2% 5 Ml Sdv) Confirm Administered Dose 5 ml .ROUTE .STK-MED

ONE


   Stop: 07/22/21 12:13


Magnesium Oxide (Magnesium Oxide 400 Mg Tab)  400 mg PO ONETIME ONE


   Stop: 07/28/21 12:01


Magnesium Oxide (Magnesium Oxide 400 Mg Tab)  400 mg PO ONETIME ONE


   Stop: 07/28/21 09:26


   Last Admin: 07/28/21 10:08 Dose:  400 mg


   Documented by: 


Melatonin (Melatonin 3 Mg Tab)  3 mg PO BEDTIME ONE


   Stop: 07/25/21 20:41


   Last Admin: 07/25/21 21:55 Dose:  3 mg


   Documented by: 


Metoclopramide HCl (Metoclopramide 10 Mg/2 Ml Sdv) Confirm Administered Dose 10 

mg .ROUTE .STK-MED ONE


   Stop: 07/22/21 12:13


Midazolam HCl (Midazolam 1 Mg/Ml 2 Ml Sdv) Confirm Administered Dose 2 mg .ROUTE

.STK-MED ONE


   Stop: 07/22/21 12:24


Morphine Sulfate (Morphine 2 Mg/Ml Syringe)  2 mg IVPUSH Q10M PRN


   PRN Reason: Pain (severe 7-10)


Naloxone HCl (Naloxone 0.4 Mg/Ml Syringe)  0.1 mg IVPUSH ASDIRECTED PRN


   PRN Reason: Respiratory Depression


Ondansetron HCl (Ondansetron 4 Mg/2 Ml Sdv) Confirm Administered Dose 4 mg 

.ROUTE .STK-MED ONE


   Stop: 07/22/21 12:13


Ondansetron HCl (Ondansetron 4 Mg/2 Ml Sdv) Confirm Administered Dose 4 mg 

.ROUTE .STK-MED ONE


   Stop: 07/22/21 14:06


Ondansetron HCl (Ondansetron 4 Mg/2 Ml Sdv)  4 mg IVPUSH ONETIME PRN


   PRN Reason: Nausea/Vomiting


Propofol (Propofol 200 Mg/20 Ml Sdv) Confirm Administered Dose 400 mg .ROUTE 

.STK-MED ONE


   Stop: 07/22/21 12:17


Propofol (Propofol 200 Mg/20 Ml Sdv) Confirm Administered Dose 200 mg .ROUTE 

.STK-MED ONE


   Stop: 07/22/21 15:06


Rocuronium Bromide (Rocuronium Bromide 50 Mg/5 Ml Syringe) Confirm Administered 

Dose 50 mg .ROUTE .STK-MED ONE


   Stop: 07/22/21 12:13


Rocuronium Bromide (Rocuronium Bromide 50 Mg/5 Ml Syringe) Confirm Administered 

Dose 50 mg .ROUTE .STK-MED ONE


   Stop: 07/22/21 14:05


Sugammadex Sodium (Sugammadex Sodium 200 Mg/2 Ml Vial) Confirm Administered Dose

200 mg .ROUTE .STK-MED ONE


   Stop: 07/22/21 12:13











- Exam


Wound/Incisions: Other (Purulent material draining from open area beneath 

umbilicus. Skin around the area is warm and erythematous )


General: Alert, Oriented


HEENT: Pupils Equal, Pupils Reactive


Lungs: Normal Respiratory Effort


Cardiovascular: Regular Rate


GI/Abdominal Exam: Soft, Non-Tender, Distended (mild)


Neurological: No New Focal Deficit





Sepsis Event Note





- Evaluation


Sepsis Screening Result: No Definite Risk





- Focused Exam


Vital Signs: 


                                   Vital Signs











  Temp Pulse Resp BP Pulse Ox


 


 07/29/21 08:00  36.0 C L  83  20  118/78  95


 


 07/29/21 04:22  36.6 C  83  16  111/70  95


 


 07/29/21 01:00  36.5 C  85  16  110/72  97














- Problem List & Annotations


(1) Perforation of sigmoid colon due to diverticulitis


SNOMED Code(s): 6395624173816114


   Code(s): K57.20 - DVTRCLI OF LG INT W PERFORATION AND ABSCESS W/O BLEEDING   

Status: Acute   Current Visit: No   





(2) Surgical wound infection


SNOMED Code(s): 96472337, 007457612


   Code(s): T81.49XA - INFECTION FOLLOWING A PROCEDURE, OTHER SURGICAL SITE, 

INIT   Status: Acute   Current Visit: Yes   





- Problem List Review


Problem List Initiated/Reviewed/Updated: Yes





- My Orders


Last 24 Hours: 


                                Medication Orders





Bisacodyl (Bisacodyl 5 Mg Tab)  5 mg PO Q12H [facility]


   Last Admin: 07/29/21 04:18  Dose: 5 mg


   Documented by: DALLAS


   Admin: 07/28/21 16:57  Dose: 5 mg


   Documented by: LILLIAN


   Admin: 07/28/21 04:00  Dose: 5 mg


   Documented by: JEROME


   Admin: 07/27/21 17:19  Dose: 5 mg


   Documented by: LILLIAN


Enoxaparin Sodium (Enoxaparin 40 Mg/0.4 Ml Syringe)  40 mg SUBCUT Q24H [facility]


   Last Admin: 07/29/21 10:43  Dose: 40 mg


   Documented by: LILLIAN


   Admin: 07/28/21 09:03  Dose: 40 mg


   Documented by: GER


   Admin: 07/27/21 09:32  Dose: 40 mg


   Documented by: LILLIAN


   Admin: 07/26/21 09:26  Dose: 40 mg


   Documented by: CLINT


   Admin: 07/25/21 08:08  Dose: 40 mg


   Documented by: VICKI


   Admin: 07/24/21 09:47  Dose: 40 mg


   Documented by: LILLIAN


   Admin: 07/23/21 08:54  Dose: 40 mg


   Documented by: RONY


Hydromorphone HCl (Hydromorphone 1 Mg/Ml Syringe)  0.5 mg IVPUSH Q1H PRN


   PRN Reason: Pain (severe 7-10)


   Last Admin: 07/23/21 16:50  Dose: 0.5 mg


   Documented by: RONY


Pantoprazole Sodium 40 mg/ (Sodium Chloride)  10 mls @ 300 mls/hr IV DAILY [facility]


   Last Admin: 07/29/21 10:36  Dose: 300 mls/hr


   Documented by: LILLIAN


   Infusion: 07/28/21 09:05  Dose: 300 mls/hr


   Documented by: LILLIAN


   Admin: 07/28/21 09:03  Dose: 300 mls/hr


   Documented by: GER


   Infusion: 07/27/21 09:34  Dose: 300 mls/hr


   Documented by: GER


   Admin: 07/27/21 09:32  Dose: 300 mls/hr


   Documented by: LILLIAN


   Infusion: 07/26/21 09:27  Dose: 300 mls/hr


   Documented by: LILLIAN


   Admin: 07/26/21 09:25  Dose: 300 mls/hr


   Documented by: MCKEKRI


   Infusion: 07/25/21 08:07  Dose: 300 mls/hr


   Documented by: CLINT


   Admin: 07/25/21 08:05  Dose: 300 mls/hr


   Documented by: VICKI


   Infusion: 07/24/21 09:44  Dose: 300 mls/hr


   Documented by: [id number]


   Admin: 07/24/21 09:42  Dose: 300 mls/hr


   Documented by: LINGPRI


   Infusion: 07/23/21 08:56  Dose: 300 mls/hr


   Documented by: LINGPRI


   Admin: 07/23/21 08:54  Dose: 300 mls/hr


   Documented by: RONY


   Infusion: 07/22/21 08:31  Dose: 300 mls/hr


   Documented by: RONY


   Admin: 07/22/21 08:29  Dose: 300 mls/hr


   Documented by: DAISY


Piperacillin Sod/Tazobactam (Sod 3.375 gm/ Sodium Chloride)  100 mls @ 200 

mls/hr IV Q6H [facility]


   Last Admin: 07/29/21 10:44  Dose: 200 mls/hr


   Documented by: LINGPRI


   Infusion: 07/29/21 04:48  Dose: 200 mls/hr


   Documented by: LINGPRI


   Admin: 07/29/21 04:18  Dose: 200 mls/hr


   Documented by: PASTGEN


   Infusion: 07/28/21 22:08  Dose: 200 mls/hr


   Documented by: PASTGEN


   Admin: 07/28/21 21:38  Dose: 200 mls/hr


   Documented by: PASTGEN


   Infusion: 07/28/21 16:29  Dose: 200 mls/hr


   Documented by: PASTGEN


   Admin: 07/28/21 15:59  Dose: 200 mls/hr


   Documented by: LINGPRI


   Infusion: 07/28/21 10:38  Dose: 200 mls/hr


   Documented by: LINGPRI


   Admin: 07/28/21 10:08  Dose: 200 mls/hr


   Documented by: LINGPRI


   Infusion: 07/28/21 03:41  Dose: 200 mls/hr


   Documented by: LINGPRI


   Admin: 07/28/21 03:11  Dose: 200 mls/hr


   Documented by: MENDMAR


   Infusion: 07/27/21 22:41  Dose: 200 mls/hr


   Documented by: MENDMAR


   Admin: 07/27/21 22:11  Dose: 200 mls/hr


   Documented by: JEROME


   Infusion: 07/27/21 17:02  Dose: 200 mls/hr


   Documented by: JEROME


   Admin: 07/27/21 16:32  Dose: 200 mls/hr


   Documented by: LILLIAN


   Infusion: 07/27/21 10:36  Dose: 200 mls/hr


   Documented by: LILLIAN


   Admin: 07/27/21 10:06  Dose: 200 mls/hr


   Documented by: LILLIAN


   Infusion: 07/27/21 04:44  Dose: 200 mls/hr


   Documented by: LILLIAN


   Admin: 07/27/21 04:14  Dose: 200 mls/hr


   Documented by: JEROME


   Infusion: 07/26/21 22:09  Dose: 200 mls/hr


   Documented by: JEROME


   Admin: 07/26/21 21:39  Dose: 200 mls/hr


   Documented by: JEROME


   Infusion: 07/26/21 16:24  Dose: 200 mls/hr


   Documented by: JEROME


   Admin: 07/26/21 15:54  Dose: 200 mls/hr


   Documented by: CLINT


Vancomycin HCl 1.5 gm/ Premix  300 mls @ 200 mls/hr IV Q12H [facility]


   Last Admin: 07/29/21 05:29  Dose: 200 mls/hr


   Documented by: DALLAS


   Infusion: 07/28/21 18:27  Dose: 200 mls/hr


   Documented by: DALLAS


   Admin: 07/28/21 16:57  Dose: 200 mls/hr


   Documented by: LILLIAN


   Infusion: 07/28/21 05:30  Dose: 200 mls/hr


   Documented by: LILLIAN


   Admin: 07/28/21 04:00  Dose: 200 mls/hr


   Documented by: JEROME


   Infusion: 07/27/21 19:21  Dose: 200 mls/hr


   Documented by: JEROME


   Admin: 07/27/21 17:51  Dose: 200 mls/hr


   Documented by: LILLIAN


Multivitamins/Minerals/Vitamin C (Multivitamin Tab)  1 tab PO BEDTIME [facility]


   Last Admin: 07/28/21 21:38  Dose: 1 tab


   Documented by: DALLAS


   Admin: 07/27/21 21:30  Dose: 1 tab


   Documented by: JEROME


   Admin: 07/26/21 21:38  Dose: 1 tab


   Documented by: JEROME


Ondansetron HCl (Ondansetron 4 Mg/2 Ml Sdv)  4 mg IVPUSH Q6H PRN


   PRN Reason: Nausea/Vomiting


Oxycodone/Acetaminophen (Acetaminophen/Oxycodone 325-5 Mg Tab)  2 tab PO Q4H PRN


   PRN Reason: Pain (severe 7-10)


Polyethylene Glycol (Polyethylene Glycol 3350 Powder 17 Gm Packet)  17 gm PO 

DAILY [facility]


   Last Admin: 07/29/21 10:36  Dose: 17 gm


   Documented by: LILLIAN


   Admin: 07/28/21 09:02  Dose: 17 gm


   Documented by: GER


   Admin: 07/27/21 09:32  Dose: 17 gm


   Documented by: LILLIAN


   Admin: 07/26/21 10:07  Dose: 17 gm


   Documented by: CLINT


Sodium Chloride (Sodium Chloride 0.9% 10 Ml Syringe)  10 ml FLUSH ASDIRECTED PRN


   PRN Reason: Keep Vein Open


   Last Admin: 07/21/21 17:41  Dose: 10 ml


   Documented by: MILY


Sodium Chloride (Sodium Chloride 0.9% 2.5 Ml Syringe)  2.5 ml FLUSH ASDIRECTED 

PRN


   PRN Reason: Keep Vein Open


   Last Admin: 07/21/21 17:41  Dose: 2.5 ml


   Documented by: MILY


Sodium Chloride (Sodium Chloride 0.9% 10 Ml Syringe)  10 ml FLUSH ASDIRECTED PRN


   PRN Reason: Keep Vein Open


Sodium Chloride (Sodium Chloride 0.9% 2.5 Ml Syringe)  2.5 ml FLUSH ASDIRECTED 

PRN


   PRN Reason: Keep Vein Open


Sodium Chloride (Sodium Chloride 0.9% 10 Ml Sdv)  10 ml IV ASDIRECTED PRN


   PRN Reason: IV Use


Vancomycin HCl (Pharmacy To Dose - Vancomycin)  1 dose .XX ASDIRECTED [facility]











- Plan


Plan                        (Free Text/Narrative):: 


I opened up the incision beneath the umbilicus today so that it could drain 

better. This will likely help with the surrounding cellulitis. CT scan showed no

large fluid collections along the midline incision. There was a small loculated 

fluid collection in the abdomen (2cm x 0.8 cm). No evidence of an abscess. I 

feel this is likely scant left over fluid. Continue IV antibiotics today given 

ongoing cellulitis. If it improves will switch to oral antibiotics tomorrow. CBC

in am. Pack midline wound BID. 


Dressing instructions: Remove old dressings. Ok to shower after that. Pat area 

dry. Pack with 1/4 packing strip and cover with dry 4 x 4 guaze. Secure in place

with tape.

## 2021-07-30 RX ADMIN — VANCOMYCIN SCH MLS/HR: 1.5 INJECTION, SOLUTION INTRAVENOUS at 05:01

## 2021-07-30 RX ADMIN — SODIUM CHLORIDE SCH MLS/HR: 9 INJECTION, SOLUTION INTRAMUSCULAR; INTRAVENOUS; SUBCUTANEOUS at 09:07

## 2021-07-30 NOTE — PCM.DCSUM1
**Discharge Summary





- Hospital Course


Free Text/Narrative:: 


Patient is a 61 year old male who presented to the ER with a finding of free air

on CT at an OSH. He was discovered to have perforated diverticulitis with a 

large defect through the sigmoid colon associated with a contained abscess. He 

was admitted to the hospital and taken for an open sigmoidectomy. During the 

case he was found to have inflammation of the appendix as it was involved in the

abscess cavity. An appendectomy was performed. The wound was washed out then 

closed with staples and had a NARESH dressing appled. The patient did well 

immediately post operative. He was distended but had no vomiting. He was 

advanced to a clear liquid diet on POD #1 then after passing gas on POD #2-3 he 

was advanced to regular. He had a BM on POD #7. POD #4 his drain was removed and

his NARESH dressing taken down. He developed incisional redness and warmth on POD 

#5. His WBC was noted to be elevated. His antibiotics were broadened to include 

vancomycin as he was already on zosyn. On POD #6 he started having drainage 

below the umbilicus. I opened the incision around the area of drainage and noted

purulent material. On POD #8 his erythema and swelling had improved. He had a CT

on POD #7 to rule out a large fluid collection in the subq space or a leak. None

was noted. His vitals are stable today. He is tolerating a regular diet. He is 

using no pain medications. He was cleared for discharge with broad spectrum 

antibiotic.  





- Discharge Data


Discharge Date: 07/30/21


Discharge Disposition: Home, Self-Care 01


Condition: Stable





- Referral to Home Health


Primary Care Physician: 


PCP None








- Discharge Diagnosis/Problem(s)


(1) Perforation of sigmoid colon due to diverticulitis


SNOMED Code(s): 1495313530917323


   ICD Code: K57.20 - DVTRCLI OF LG INT W PERFORATION AND ABSCESS W/O BLEEDING  

Status: Acute   





(2) Surgical wound infection


SNOMED Code(s): 16871141, 821190036


   ICD Code: T81.49XA - INFECTION FOLLOWING A PROCEDURE, OTHER SURGICAL SITE, 

INIT   Status: Acute   





- Patient Summary/Data


Operative Procedure(s) Performed: Sigmoidectomy, appendectomy





- Patient Instructions


Diet: Regular Diet as Tolerated, Drink 8-10+ Glasses/Day


Activity: No Lifting Over 20 Pounds (for six weeks )


Driving: May Drive Today


Showering/Bathing: May Shower, No Tub Bathing/Swimming (for one week )


Wound/Incision Care: Keep Operative Site/Wound Site Clean and Dry


Notify Provider of: Fever, Increased Pain, Swelling and Redness, Drainage, 

Nausea and/or Vomiting


Other/Special Instructions: No work from 7/22-8/8. Ok to return to work on 8/8. 

No lifting >20lb from 7/22-9/2





- Discharge Plan


*PRESCRIPTION DRUG MONITORING PROGRAM REVIEWED*: Not Applicable


*COPY OF PRESCRIPTION DRUG MONITORING REPORT IN PATIENT REZA: Not Applicable


Prescriptions/Med Rec: 


Sulfamethoxazole/Trimethoprim [Bactrim Ds Tablet] 1 each PO TID #10 tablet


bisacodyL [Dulcolax] 5 mg PO Q12H #14 tablet


metroNIDAZOLE [Flagyl] 500 mg PO Q8H #15 tab


polyethylene glycoL 3350 [MiraLAX] 17 gm PO DAILY #7 packet


Home Medications: 


                                    Home Meds





Ibuprofen 200 mg PO Q6H PRN 07/21/21 [History]


Multivit-Min/FA/Lycopen/Lutein [Centrum Silver Men Tablet] 1 tab PO DAILY 

07/21/21 [History]


Potassium Gluconate [Potassium] 99 mg PO DAILY 07/21/21 [History]


Magnesium Oxide 400 mg PO DAILY 07/22/21 [History]


Sulfamethoxazole/Trimethoprim [Bactrim Ds Tablet] 1 each PO TID #10 tablet 

07/30/21 [Rx]


bisacodyL [Dulcolax] 5 mg PO Q12H #14 tablet 07/30/21 [Rx]


metroNIDAZOLE [Flagyl] 500 mg PO Q8H #15 tab 07/30/21 [Rx]


polyethylene glycoL 3350 [MiraLAX] 17 gm PO DAILY #7 packet 07/30/21 [Rx]








Patient Handouts:  Bisacodyl tablets and capsules, Metronidazole extended-

release tablets, Wound Infection, Easy-to-Read, Sulfamethoxazole; Trimethoprim, 

SMX-TMP tablets, Metronidazole tablets or capsules, Polyethylene Glycol powder


Referrals: 


Sonia Garcia MD [Physician] - 08/06/21 8:30 am





- Discharge Summary/Plan Comment


DC Time >30 min.: Yes (35)





- Patient Data


Vitals - Most Recent: 


                                Last Vital Signs











Temp  36.1 C   07/30/21 07:59


 


Pulse  82   07/30/21 07:59


 


Resp  20   07/30/21 07:59


 


BP  102/72   07/30/21 07:59


 


Pulse Ox  95   07/30/21 07:59











Weight - Most Recent: 103.555 kg


I&O - Last 24 hours: 


                                 Intake & Output











 07/29/21 07/30/21 07/30/21





 22:59 06:59 14:59


 


Intake Total 1950 650 


 


Output Total  0 


 


Balance 1950 650 











Med Orders - Current: 


                               Current Medications





Bisacodyl (Bisacodyl 5 Mg Tab)  5 mg PO Q12H Count includes the [facility]


   Last Admin: 07/30/21 04:23 Dose:  5 mg


   Documented by: 


Enoxaparin Sodium (Enoxaparin 40 Mg/0.4 Ml Syringe)  40 mg SUBCUT Q24H Count includes the [facility]


   Last Admin: 07/30/21 09:14 Dose:  40 mg


   Documented by: 


Hydromorphone HCl (Hydromorphone 1 Mg/Ml Syringe)  0.5 mg IVPUSH Q1H PRN


   PRN Reason: Pain (severe 7-10)


   Last Admin: 07/23/21 16:50 Dose:  0.5 mg


   Documented by: 


Pantoprazole Sodium 40 mg/ (Sodium Chloride)  10 mls @ 300 mls/hr IV DAILY Count includes the [facility]


   Last Admin: 07/30/21 09:07 Dose:  300 mls/hr


   Documented by: 


Piperacillin Sod/Tazobactam (Sod 3.375 gm/ Sodium Chloride)  100 mls @ 200 

mls/hr IV Q6H Count includes the [facility]


   Last Admin: 07/30/21 09:08 Dose:  200 mls/hr


   Documented by: 


Vancomycin HCl 1.5 gm/ Premix  300 mls @ 200 mls/hr IV Q12H Count includes the [facility]


   Last Admin: 07/30/21 05:01 Dose:  200 mls/hr


   Documented by: 


Multivitamins/Minerals/Vitamin C (Multivitamin Tab)  1 tab PO BEDTIME Count includes the [facility]


   Last Admin: 07/29/21 21:19 Dose:  1 tab


   Documented by: 


Ondansetron HCl (Ondansetron 4 Mg/2 Ml Sdv)  4 mg IVPUSH Q6H PRN


   PRN Reason: Nausea/Vomiting


Oxycodone/Acetaminophen (Acetaminophen/Oxycodone 325-5 Mg Tab)  2 tab PO Q4H PRN


   PRN Reason: Pain (severe 7-10)


Polyethylene Glycol (Polyethylene Glycol 3350 Powder 17 Gm Packet)  17 gm PO 

DAILY Count includes the [facility]


   Last Admin: 07/30/21 09:07 Dose:  17 gm


   Documented by: 


Sodium Chloride (Sodium Chloride 0.9% 10 Ml Syringe)  10 ml FLUSH ASDIRECTED PRN


   PRN Reason: Keep Vein Open


   Last Admin: 07/21/21 17:41 Dose:  10 ml


   Documented by: 


Sodium Chloride (Sodium Chloride 0.9% 2.5 Ml Syringe)  2.5 ml FLUSH ASDIRECTED 

PRN


   PRN Reason: Keep Vein Open


   Last Admin: 07/21/21 17:41 Dose:  2.5 ml


   Documented by: 


Sodium Chloride (Sodium Chloride 0.9% 10 Ml Syringe)  10 ml FLUSH ASDIRECTED PRN


   PRN Reason: Keep Vein Open


Sodium Chloride (Sodium Chloride 0.9% 2.5 Ml Syringe)  2.5 ml FLUSH ASDIRECTED 

PRN


   PRN Reason: Keep Vein Open


Sodium Chloride (Sodium Chloride 0.9% 10 Ml Sdv)  10 ml IV ASDIRECTED PRN


   PRN Reason: IV Use


Vancomycin HCl (Pharmacy To Dose - Vancomycin)  1 dose .XX ASDIRECTED Count includes the [facility]





Discontinued Medications





Albuterol (Albuterol 0.083% 2.5 Mg/3 Ml Neb Soln)  2.5 mg NEB ONETIME PRN


   PRN Reason: Wheezing


Bupivacaine HCl (Bupivacaine 0.5% 30 Ml Sdv) Confirm Administered Dose 60 ml 

.ROUTE .STK-MED ONE


   Stop: 07/22/21 11:29


Bupivacaine HCl (Bupivacaine 0.5% 30 Ml Sdv) Confirm Administered Dose 30 ml 

.ROUTE .STK-MED ONE


   Stop: 07/22/21 12:50


Cefazolin Sodium (Cefazolin 1 Gm Vial) Confirm Administered Dose 1 gm .ROUTE 

.STK-MED ONE


   Stop: 07/22/21 12:50


Cefazolin Sodium (Cefazolin 1 Gm Vial) Confirm Administered Dose 1 gm .ROUTE 

.STK-MED ONE


   Stop: 07/22/21 13:58


Cyclobenzaprine HCl (Cyclobenzaprine 5 Mg Tab)  5 mg PO TID Count includes the [facility]


   Last Admin: 07/28/21 06:00 Dose:  Not Given


   Documented by: 


Desflurane (Desflurane 240 Ml Bottle) Confirm Administered Dose 240 ml .ROUTE 

.STK-MED ONE


   Stop: 07/22/21 16:30


Dexmedetomidine HCl (Dexmedetomidine 200 Mcg/2 Ml Sdv) Confirm Administered Dose

 200 mcg .ROUTE .STK-MED ONE


   Stop: 07/22/21 12:13


Droperidol (Droperidol 5 Mg/2 Ml Sdv)  0.625 mg IVPUSH ONETIME PRN


   PRN Reason: Nausea/Vomiting


Fentanyl (Fentanyl 250 Mcg/5 Ml Sdv) Confirm Administered Dose 250 mcg .ROUTE 

.STK-MED ONE


   Stop: 07/22/21 12:11


Fentanyl (Fentanyl 100 Mcg/2 Ml Sdv) Confirm Administered Dose 100 mcg .ROUTE 

.STK-MED ONE


   Stop: 07/22/21 16:30


Fentanyl (Fentanyl 100 Mcg/2 Ml Sdv)  50 mcg IVPUSH Q5M PRN


   PRN Reason: Pain (mild 1-3)


Glycopyrrolate (Glycopyrrolate 0.2 Mg/Ml Sdv) Confirm Administered Dose 0.2 mg 

.ROUTE .STK-MED ONE


   Stop: 07/22/21 12:13


Hydromorphone HCl (Hydromorphone 2 Mg/Ml Syringe) Confirm Administered Dose 2 mg

 .ROUTE .STK-MED ONE


   Stop: 07/22/21 14:13


Hydromorphone HCl (Hydromorphone 2 Mg/Ml Syringe)  1 mg IVPUSH Q10M PRN


   PRN Reason: Pain (moderate 4-6)


Sodium Chloride (Normal Saline)  1,000 mls @ 125 mls/hr IV STAT ONE


   Stop: 07/22/21 01:05


   Last Admin: 07/21/21 17:40 Dose:  125 mls/hr


   Documented by: 


Piperacillin Sod/Tazobactam (Sod 4.5 gm/ Sodium Chloride)  100 mls @ 100 mls/hr 

IV ONETIME ONE


   Stop: 07/21/21 20:00


   Last Admin: 07/21/21 19:31 Dose:  Not Given


   Documented by: 


Piperacillin Sod/Tazobactam (Sod 3.375 gm/ Sodium Chloride)  50 mls @ 100 mls/hr

 IV ONETIME ONE


   Stop: 07/21/21 19:31


   Last Admin: 07/21/21 19:29 Dose:  100 mls/hr


   Documented by: 


Lactated Ringer's (Ringers, Lactated)  1,000 mls @ 1,000 mls/hr IV ASDIRECTED 

Count includes the [facility]


   Last Admin: 07/23/21 05:25 Dose:  150 mls/hr


   Documented by: 


Piperacillin Sod/Tazobactam (Sod 3.375 gm/ Sodium Chloride)  50 mls @ 100 mls/hr

 IV Q6H [facility]


   Last Admin: 07/25/21 13:50 Dose:  100 mls/hr


   Documented by: 


Acetaminophen (Ofirmev 1000 Mg/100 Ml) Confirm Administered Dose 100 mls @ as 

directed .ROUTE .STK-MED ONE


   Stop: 07/22/21 12:57


Sodium Chloride (Normal Saline) Confirm Administered Dose 20 mls @ as directed 

.ROUTE .STK-MED ONE


   Stop: 07/22/21 14:20


Acetaminophen 1,000 mg/ Premix  100 mls @ 400 mls/hr IV Q6H PRN


   PRN Reason: Pain


Lactated Ringer's (Ringers, Lactated)  1,000 mls @ 1,000 mls/hr IV ASDIRECTED 

ONE


   Stop: 07/23/21 00:59


   Last Admin: 07/23/21 00:00 Dose:  1,000 mls/hr


   Documented by: 


Piperacillin Sod/Tazobactam (Sod 3.375 gm/ Sodium Chloride)  50 mls @ 100 mls/hr

 IV Q6H Count includes the [facility]


   Last Admin: 07/26/21 14:59 Dose:  Not Given


   Documented by: 


Iopamidol (Iopamidol 755 Mg/Ml 100 Ml Bottle)  100 ml IVPUSH ONETIME STA


   Stop: 07/21/21 19:09


   Last Admin: 07/22/21 08:18 Dose:  Not Given


   Documented by: 


Iopamidol (Iopamidol 755 Mg/Ml 500 Ml Multipack Bottle)  100 ml IVPUSH ONETIME 

STA


   Stop: 07/29/21 09:04


   Last Admin: 07/29/21 09:41 Dose:  100 ml


   Documented by: 


Ketamine HCl (Ketamine 500 Mg/10 Ml Mdv) Confirm Administered Dose 500 mg .ROUTE

 .STK-MED ONE


   Stop: 07/22/21 12:14


Ketorolac Tromethamine (Ketorolac 30 Mg/Ml Sdv) Confirm Administered Dose 30 mg 

.ROUTE .STK-MED ONE


   Stop: 07/22/21 12:14


Ketorolac Tromethamine (Ketorolac 30 Mg/Ml Sdv)  30 mg IVPUSH Q6H [facility]


   Stop: 07/27/21 23:59


   Last Admin: 07/25/21 05:15 Dose:  30 mg


   Documented by: 


Ketorolac Tromethamine (Ketorolac 30 Mg/Ml Sdv)  30 mg IVPUSH Q6H PRN


   PRN Reason: Abdominal Pain


   Stop: 07/27/21 23:59


   Last Admin: 07/25/21 20:51 Dose:  30 mg


   Documented by: 


Lidocaine (Lidocaine 2% 5 Ml Sdv) Confirm Administered Dose 5 ml .ROUTE .STK-MED

 ONE


   Stop: 07/22/21 12:13


Magnesium Oxide (Magnesium Oxide 400 Mg Tab)  400 mg PO ONETIME ONE


   Stop: 07/28/21 12:01


Magnesium Oxide (Magnesium Oxide 400 Mg Tab)  400 mg PO ONETIME ONE


   Stop: 07/28/21 09:26


   Last Admin: 07/28/21 10:08 Dose:  400 mg


   Documented by: 


Melatonin (Melatonin 3 Mg Tab)  3 mg PO BEDTIME ONE


   Stop: 07/25/21 20:41


   Last Admin: 07/25/21 21:55 Dose:  3 mg


   Documented by: 


Metoclopramide HCl (Metoclopramide 10 Mg/2 Ml Sdv) Confirm Administered Dose 10 

mg .ROUTE .STK-MED ONE


   Stop: 07/22/21 12:13


Midazolam HCl (Midazolam 1 Mg/Ml 2 Ml Sdv) Confirm Administered Dose 2 mg .ROUTE

 .STK-MED ONE


   Stop: 07/22/21 12:24


Morphine Sulfate (Morphine 2 Mg/Ml Syringe)  2 mg IVPUSH Q10M PRN


   PRN Reason: Pain (severe 7-10)


Naloxone HCl (Naloxone 0.4 Mg/Ml Syringe)  0.1 mg IVPUSH ASDIRECTED PRN


   PRN Reason: Respiratory Depression


Ondansetron HCl (Ondansetron 4 Mg/2 Ml Sdv) Confirm Administered Dose 4 mg 

.ROUTE .STK-MED ONE


   Stop: 07/22/21 12:13


Ondansetron HCl (Ondansetron 4 Mg/2 Ml Sdv) Confirm Administered Dose 4 mg 

.ROUTE .STK-MED ONE


   Stop: 07/22/21 14:06


Ondansetron HCl (Ondansetron 4 Mg/2 Ml Sdv)  4 mg IVPUSH ONETIME PRN


   PRN Reason: Nausea/Vomiting


Propofol (Propofol 200 Mg/20 Ml Sdv) Confirm Administered Dose 400 mg .ROUTE 

.STK-MED ONE


   Stop: 07/22/21 12:17


Propofol (Propofol 200 Mg/20 Ml Sdv) Confirm Administered Dose 200 mg .ROUTE 

.STK-MED ONE


   Stop: 07/22/21 15:06


Rocuronium Bromide (Rocuronium Bromide 50 Mg/5 Ml Syringe) Confirm Administered 

Dose 50 mg .ROUTE .STK-MED ONE


   Stop: 07/22/21 12:13


Rocuronium Bromide (Rocuronium Bromide 50 Mg/5 Ml Syringe) Confirm Administered 

Dose 50 mg .ROUTE .STK-MED ONE


   Stop: 07/22/21 14:05


Sugammadex Sodium (Sugammadex Sodium 200 Mg/2 Ml Vial) Confirm Administered Dose

 200 mg .ROUTE .STK-MED ONE


   Stop: 07/22/21 12:13

## 2021-12-14 ENCOUNTER — HOSPITAL ENCOUNTER (OUTPATIENT)
Dept: HOSPITAL 56 - MW.SDS | Age: 61
Discharge: HOME | End: 2021-12-14
Attending: SURGERY
Payer: COMMERCIAL

## 2021-12-14 DIAGNOSIS — Z90.49: ICD-10-CM

## 2021-12-14 DIAGNOSIS — D12.2: ICD-10-CM

## 2021-12-14 DIAGNOSIS — Z12.11: Primary | ICD-10-CM

## 2021-12-14 DIAGNOSIS — D12.0: ICD-10-CM

## 2021-12-14 DIAGNOSIS — Z79.899: ICD-10-CM

## 2021-12-14 DIAGNOSIS — D12.4: ICD-10-CM

## 2021-12-14 DIAGNOSIS — Z98.890: ICD-10-CM

## 2021-12-14 DIAGNOSIS — D12.3: ICD-10-CM

## 2021-12-14 DIAGNOSIS — Z87.19: ICD-10-CM

## 2021-12-14 PROCEDURE — 45385 COLONOSCOPY W/LESION REMOVAL: CPT

## 2021-12-14 PROCEDURE — 45380 COLONOSCOPY AND BIOPSY: CPT
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
## 2021-12-14 NOTE — [id number]
Post Anesthesia Note





- EVALUATION WITHIN 48HRS OF ANESTHETIC


Vital Signs in Normal Range: Yes


Patient Participated in Evaluation: Yes


Respiratory Function Stable: Yes


Airway Patent: Yes


Cardiovascular Function Stable: Yes


Hydration Status Stable: Yes


Pain Control Satisfactory: Yes


Nausea and Vomiting Control Satisfactory: Yes


Mental Status Recovered: Yes


Vital Signs: 


                                Last Vital Signs











Temp  97.5 F   12/14/21 06:30


 


Pulse  83   12/14/21 10:15


 


Resp  16   12/14/21 10:15


 


BP  96/64   12/14/21 10:15


 


Pulse Ox  96   12/14/21 10:15

## 2021-12-14 NOTE — PCM.POSTAN
POST ANESTHESIA ASSESSMENT





- MENTAL STATUS


Mental Status: Alert, Oriented





- VITAL SIGNS


Vital Signs: 


                                Last Vital Signs











Temp  97.5 F   12/14/21 06:30


 


Pulse  83   12/14/21 10:15


 


Resp  16   12/14/21 10:15


 


BP  96/64   12/14/21 10:15


 


Pulse Ox  96   12/14/21 10:15














- RESPIRATORY


Respiratory Status: Respiratory Rate WNL, Airway Patent, O2 Saturation Stable





- CARDIOVASCULAR


CV Status: Pulse Rate WNL, Blood Pressure Stable





- GASTROINTESTINAL


GI Status: No Symptoms





- POST OP HYDRATION


Hydration Status: Adequate & Stable

## 2021-12-14 NOTE — OR
SURGEON:

SONIA GARCIA MD

 

DATE OF PROCEDURE:  12/14/2021

 

PREOPERATIVE DIAGNOSIS:

History of diverticulitis.

 

POSTOPERATIVE DIAGNOSES:

1. Cecal polyp x2.

2. Ascending colon polyp x1.

3. Hepatic flexure polyp x2.

4. Splenic flexure polyp x2.

5. Descending colon polyp x2.

 

PROCEDURES PERFORMED:

Diagnostic colonoscopy with polypectomy.

 

PRIMARY SURGEON:

Sonia Garcia MD

 

ANESTHESIA:

MAC.

 

INSTRUMENT USED:

Olympus colonoscope.

 

EXTENT OF EXAM:

To the cecum.

 

PREPARATION:

Good.

 

LIMITATIONS:

None.

 

INDICATIONS FOR EXAMINATION:

Patient is a 61-year-old male who presented this summer to the ER with

perforated diverticulitis.  He underwent a sigmoidectomy.  He has now recovered

from this procedure.  He has never had a colonoscopy.  The patient and I

discussed the need for a diagnostic colonoscopy.  I explained the procedure,

expected perioperative course, and the risks.  He verbalized understanding and

wishes to proceed.

 

PROCEDURE IN DETAIL:

The patient was brought in to the endoscopy suite and placed in a left lateral

decubitus position.  A time-out was completed verifying the patient's name, age,

date of birth, allergies, and procedure to be performed.  Anesthesia was induced

and continuous oxygen was provided via face mask throughout the procedure.

After adequate sedation was achieved, a digital rectal exam was performed.  This

exam was within normal limits.  A well-lubricated colonoscope was inserted in

the rectum and advanced under direct visualization to the level of the cecum.

The cecum was identified by both visual and anatomic landmarks.  A photograph

was taken of the cecal cap, however, I was unable to retroflex the scope within

the cecum due to looping of the scope more proximally.  The scope was then

straightened out and fully withdrawn while examining the color, texture,

anatomy, and integrity of the mucosa from the cecum to the anal canal.  The

patient had multiple polyps throughout the colon.  There were two sessile small

polyps within the cecum as well as one in the ascending colon.  These were all

removed in piecemeal fashion using a cold biopsy forceps.  The patient had two

hepatic flexure polyps.  These were removed in similar fashion.  At the splenic

flexure and in the descending colon, the patient had some pedunculated larger-

appearing polyps.  These were removed with a hot snare and sent to Pathology

labeled as splenic flexure polyp #1 and #2, and descending colon polyp #1 and

#2.  The scope was then brought into the rectum and retroflexed to allow

visualization of the anal canal opening.  This appeared normal and a photograph

was taken.  The scope was straightened out and fully withdrawn.  The cecum to

anus time was 33 minutes.  The patient tolerated the procedure well and was

transferred to the PACU in stable condition.

 

ENDOSCOPIC DIAGNOSES:

1. Cecal polyp x2.

2. Ascending colon polyp x1.

3. Hepatic flexure polyp x2.

4. Splenic flexure polyp x2.

5. Descending colon polyp x2.

 

RECOMMENDATION:

Follow up in clinic in two weeks.

 

 

ODALIS FERNANDO

DD:  12/14/2021 11:17:14

DT:  12/14/2021 13:54:29

Job #:  [id number]

## 2021-12-14 NOTE — PCM.OPNOTE
- General Post-Op/Procedure Note


Date of Surgery/Procedure: 12/14/21


Operative Procedure(s): Diagnostic colonoscopy


Findings: 


Cecal polyp x 2, ascending colon polyp, hepatic flexure polyp x 2, splenic 

flexure polyp x 2, descending colon polyp x 2. Well healed incision line in 

sigmoid colon 


Pre Op Diagnosis: History of diverticulitis


Post-Op Diagnosis: Cecal polyp x 2, ascending colon polyp, hepatic flexure polyp

x 2, splenic flexure polyp x 2, descending colon polyp x 2


Anesthesia Technique: MAC


Primary Surgeon: Sonia Garcia


Condition: Good